# Patient Record
Sex: FEMALE | Race: WHITE | Employment: FULL TIME | URBAN - METROPOLITAN AREA
[De-identification: names, ages, dates, MRNs, and addresses within clinical notes are randomized per-mention and may not be internally consistent; named-entity substitution may affect disease eponyms.]

---

## 2017-01-27 ENCOUNTER — OFFICE VISIT (OUTPATIENT)
Dept: FAMILY MEDICINE | Facility: CLINIC | Age: 37
End: 2017-01-27
Payer: COMMERCIAL

## 2017-01-27 VITALS
SYSTOLIC BLOOD PRESSURE: 110 MMHG | HEIGHT: 62 IN | WEIGHT: 174 LBS | HEART RATE: 72 BPM | TEMPERATURE: 98.3 F | RESPIRATION RATE: 16 BRPM | BODY MASS INDEX: 32.02 KG/M2 | OXYGEN SATURATION: 98 % | DIASTOLIC BLOOD PRESSURE: 78 MMHG

## 2017-01-27 DIAGNOSIS — L21.0 SEBORRHEA CAPITIS: ICD-10-CM

## 2017-01-27 DIAGNOSIS — J20.9 ACUTE BRONCHITIS WITH SYMPTOMS > 10 DAYS: Primary | ICD-10-CM

## 2017-01-27 PROCEDURE — 99213 OFFICE O/P EST LOW 20 MIN: CPT | Performed by: PHYSICIAN ASSISTANT

## 2017-01-27 RX ORDER — SELENIUM SULFIDE 2.5 MG/100ML
1 LOTION TOPICAL
Qty: 118 ML | Refills: 0 | Status: SHIPPED | OUTPATIENT
Start: 2017-01-28 | End: 2021-01-29

## 2017-01-27 RX ORDER — AZITHROMYCIN 250 MG/1
TABLET, FILM COATED ORAL
Qty: 6 TABLET | Refills: 0 | Status: SHIPPED | OUTPATIENT
Start: 2017-01-27 | End: 2017-07-11

## 2017-01-27 ASSESSMENT — ENCOUNTER SYMPTOMS
NAUSEA: 0
DIARRHEA: 0
FEVER: 0
CHILLS: 0
SHORTNESS OF BREATH: 0
HEADACHES: 0
COUGH: 1
ABDOMINAL PAIN: 0
FOCAL WEAKNESS: 0
SORE THROAT: 1
VOMITING: 0

## 2017-01-27 NOTE — PROGRESS NOTES
SUBJECTIVE:                                                    Angeline Aceves is a 36 year old female who presents to clinic today for the following health issues:      RESPIRATORY SYMPTOMS      Duration: 12/21/2016    Description  nasal congestion, rhinorrhea, sore throat, cough    Severity: moderate    Accompanying signs and symptoms: None    History (predisposing factors):  none    Precipitating or alleviating factors: None    Therapies tried and outcome:  rest and fluids acetaminophen     Additional complaints: None    HPI additional notes:  Patient reports symptoms have not improved since onset. She reports cough is worst symptom right now. Denies weight loss, hemoptysis, night sweats, CP, SOB, orthopnea, PND, leg swelling. No known exposure to TB, pertussis, or pneumonia. No hx tobacco use or lung disease.       Chart Review:  History   Smoking status     Never Smoker    Smokeless tobacco     Never Used     No flowsheet data found.  No flowsheet data found.    Patient Active Problem List   Diagnosis     Unilateral deafness     CARDIOVASCULAR SCREENING; LDL GOAL LESS THAN 160     Seasonal allergies     Large breasts     Headache     Anemia     Irritable bowel syndrome     Other acne     Past Surgical History   Procedure Laterality Date     C anesth,cleft palate repair       10 surgeries 2 weeks-16 years of age     Ent surgery       Tubes as a child, exploratory on right in '97     Problem list, Medication list, Allergies, Medical/Social/Surg hx reviewed in [facility], updated as appropriate.    HPI      Review of Systems   Constitutional: Negative for fever and chills.   HENT: Positive for congestion and sore throat.         Rhinorrhea   Respiratory: Positive for cough. Negative for shortness of breath.    Cardiovascular: Negative for chest pain.   Gastrointestinal: Negative for nausea, vomiting, abdominal pain and diarrhea.   Skin: Negative for rash.   Neurological: Negative for focal weakness and headaches.  "  All other systems reviewed and are negative.        Physical Exam   Constitutional: She is oriented to person, place, and time and well-developed, well-nourished, and in no distress.   HENT:   Head: Normocephalic and atraumatic.   Right Ear: Tympanic membrane, external ear and ear canal normal.   Left Ear: Tympanic membrane, external ear and ear canal normal.   Nose: Nose normal.   Mouth/Throat: Uvula is midline, oropharynx is clear and moist and mucous membranes are normal.   Cardiovascular: Normal rate, regular rhythm and normal heart sounds.    Pulmonary/Chest: Effort normal and breath sounds normal.   Musculoskeletal: Normal range of motion.   Neurological: She is alert and oriented to person, place, and time. Gait normal.   Skin: Skin is warm and dry.   Nursing note and vitals reviewed.      Vital Signs  /78 mmHg  Pulse 72  Temp(Src) 98.3  F (36.8  C) (Oral)  Resp 16  Ht 5' 1.5\" (1.562 m)  Wt 174 lb (78.926 kg)  BMI 32.35 kg/m2  SpO2 98%   Body mass index is 32.35 kg/(m^2).    Diagnostic Test Results:  none     ASSESSMENT/PLAN:                                                    BMI:   Estimated body mass index is 32.35 kg/(m^2) as calculated from the following:    Height as of this encounter: 5' 1.5\" (1.562 m).    Weight as of this encounter: 174 lb (78.926 kg).   Weight management plan: Patient was referred to their PCP to discuss a diet and exercise plan.      ICD-10-CM    1. Acute bronchitis with symptoms > 10 days J20.9 azithromycin (ZITHROMAX) 250 MG tablet   2. Seborrhea capitis L21.0 selenium sulfide (SELSUN) 2.5 % lotion   Lungs CTAB, afebrile, O2 sat 98%- CXR not indicated. Will cover for pertussis and acute bronchitis with azithromycin. Pt declines Rx for cough medication.    Selsun lotion refilled. Encouraged pt to make appt for routine physical.    I have discussed any lab or imaging results, the patient's diagnosis, and my plan of treatment with the patient and/or family. Patient is " aware to come back in if with worsening symptoms or if no relief despite treatment plan.  Patient voiced understanding and had no further questions.       Follow Up: Data Unavailable    PRACHI Hagen, PA-C  [facility]

## 2017-01-27 NOTE — NURSING NOTE
"Chief Complaint   Patient presents with     Cough       Initial /78 mmHg  Pulse 72  Temp(Src) 98.3  F (36.8  C) (Oral)  Resp 16  Ht 5' 1.5\" (1.562 m)  Wt 174 lb (78.926 kg)  BMI 32.35 kg/m2  SpO2 98% Estimated body mass index is 32.35 kg/(m^2) as calculated from the following:    Height as of this encounter: 5' 1.5\" (1.562 m).    Weight as of this encounter: 174 lb (78.926 kg).  BP completed using cuff size: dean Salazar CMA      "

## 2017-01-27 NOTE — PROGRESS NOTES
"  SUBJECTIVE:                                                    Angeline Aceves is a 36 year old female who presents to clinic today for the following health issues:  {Provider please address medication reconciliation discrepancies--rooming staff please delete if no med/rec issues}    Cough      Duration: ***    Description (location/character/radiation): ***    Intensity:  {mild,moderate,severe:874425}    Accompanying signs and symptoms: ***    History (similar episodes/previous evaluation): {.:859193::\"None\"}    Precipitating or alleviating factors: {.:922837::\"None\"}    Therapies tried and outcome: {.:266125::\"None\"}       {additional problems for provider to add:651958}    Problem list and histories reviewed & adjusted, as indicated.  Additional history: as documented    {HIST REVIEW/ LINKS 2:742024}    {PROVIDER CHARTING PREFERENCE:465217}    "

## 2017-07-11 ENCOUNTER — OFFICE VISIT (OUTPATIENT)
Dept: FAMILY MEDICINE | Facility: CLINIC | Age: 37
End: 2017-07-11
Payer: COMMERCIAL

## 2017-07-11 VITALS
BODY MASS INDEX: 32.02 KG/M2 | RESPIRATION RATE: 16 BRPM | WEIGHT: 174 LBS | OXYGEN SATURATION: 98 % | HEIGHT: 62 IN | HEART RATE: 78 BPM | SYSTOLIC BLOOD PRESSURE: 115 MMHG | DIASTOLIC BLOOD PRESSURE: 74 MMHG | TEMPERATURE: 98 F

## 2017-07-11 DIAGNOSIS — J30.2 CHRONIC SEASONAL ALLERGIC RHINITIS, UNSPECIFIED TRIGGER: ICD-10-CM

## 2017-07-11 DIAGNOSIS — Z01.419 ENCOUNTER FOR GYNECOLOGICAL EXAMINATION WITHOUT ABNORMAL FINDING: Primary | ICD-10-CM

## 2017-07-11 LAB — HGB BLD-MCNC: 11.9 G/DL (ref 11.7–15.7)

## 2017-07-11 PROCEDURE — 80061 LIPID PANEL: CPT | Performed by: NURSE PRACTITIONER

## 2017-07-11 PROCEDURE — 99395 PREV VISIT EST AGE 18-39: CPT | Performed by: NURSE PRACTITIONER

## 2017-07-11 PROCEDURE — 85018 HEMOGLOBIN: CPT | Performed by: NURSE PRACTITIONER

## 2017-07-11 PROCEDURE — 82947 ASSAY GLUCOSE BLOOD QUANT: CPT | Performed by: NURSE PRACTITIONER

## 2017-07-11 PROCEDURE — 36415 COLL VENOUS BLD VENIPUNCTURE: CPT | Performed by: NURSE PRACTITIONER

## 2017-07-11 RX ORDER — FLUTICASONE PROPIONATE 50 MCG
1-2 SPRAY, SUSPENSION (ML) NASAL DAILY
Qty: 1 BOTTLE | Refills: 11 | COMMUNITY
Start: 2017-07-11 | End: 2021-01-29

## 2017-07-11 RX ORDER — BETAMETHASONE DIPROPIONATE 0.5 MG/G
LOTION TOPICAL 2 TIMES DAILY
COMMUNITY
End: 2021-01-29

## 2017-07-11 RX ORDER — ADAPALENE 0.1 G/100G
CREAM TOPICAL AT BEDTIME
COMMUNITY
End: 2021-01-29

## 2017-07-11 NOTE — MR AVS SNAPSHOT
After Visit Summary   7/11/2017    Angeline Aceves    MRN: [id number]           Patient Information     Date Of Birth          1980        Visit Information        Provider Department      7/11/2017 7:40 AM Tri Wagner APRN [facility]        Today's Diagnoses     Encounter for gynecological examination without abnormal finding    -  1    Chronic seasonal allergic rhinitis, unspecified trigger          Care Instructions      Preventive Health Recommendations  Female Ages 26 - 39  Yearly exam:   See your health care provider every year in order to    Review health changes.     Discuss preventive care.      Review your medicines if you your doctor has prescribed any.    Until age 30: Get a Pap test every three years (more often if you have had an abnormal result).    After age 30: Talk to your doctor about whether you should have a Pap test every 3 years or have a Pap test with HPV screening every 5 years.   You do not need a Pap test if your uterus was removed (hysterectomy) and you have not had cancer.  You should be tested each year for STDs (sexually transmitted diseases), if you're at risk.   Talk to your provider about how often to have your cholesterol checked.  If you are at risk for diabetes, you should have a diabetes test (fasting glucose).  Shots: Get a flu shot each year. Get a tetanus shot every 10 years.   Nutrition:     Eat at least 5 servings of fruits and vegetables each day.    Eat whole-grain bread, whole-wheat pasta and brown rice instead of white grains and rice.    Talk to your provider about Calcium and Vitamin D.     Lifestyle    Exercise at least 150 minutes a week (30 minutes a day, 5 days of the week). This will help you control your weight and prevent disease.    Limit alcohol to one drink per day.    No smoking.     Wear sunscreen to prevent skin cancer.    See your dentist every six months for an exam and cleaning.    Seasonal  Allergy  Seasonal allergy is also called hay fever. It may occur after a person is exposed to pollens released from grasses, weeds, trees and shrubs. This type of allergy occurs during the spring and summer when the pollen contacts the lining of the nose, eyes, eyelids, sinuses and throat. This causes histamine to be released from the tissues.  Histamine causes itching and swelling.  This may produce a watery discharge from the eyes or nose. Violent sneezing, nasal congestion, post-nasal drip, itching of the eyes, nose, throat and mouth, scratchy throat, and dry cough may also occur.    Home care  Seasonal allergy cannot be cured, but symptoms can be reduced by these measures:    Avoid or reduce exposure to the allergen as much as you can:      Stay indoors on windy days of pollen season.     Keep windows and doors closed. Use air conditioning instead in your home and car. This filters the air.    Change air conditioner filters often.    Decongestant pills and sprays reduce tissue swelling and watery discharge. Overuse of nasal decongestant sprays may make symptoms worse. Do not use these more often than recommended. Sometimes you can experience a rebound effect (symptoms worsen), when stopping them. Talk to your healthcare provider or pharmacist about these medicines before taking them, especially if you have hypertension or heart problems.     Antihistamines block the release of histamine during the allergic response. They work better when taken BEFORE symptoms develop. Unless a prescription antihistamine was prescribed, you can take over-the-counter antihistamines that do not cause drowsiness.  Ask your pharmacist for suggestions.    Steroid nasal sprays or oral steroids may also be prescribed for more severe symptoms. These help to reduce the local inflammation that can add to the allergic response.    If you have asthma, pollen season may make your asthma symptoms worse. It is important that you use your asthma  medicines as directed during this time to prevent or treat attacks. Some persons with ASTHMA have asthma symptoms that get worse when they take antihistamines. This is due to the drying effect on the lungs. If you notice this, stop the antihistamines, drink extra fluids and notify your doctor.    If you have sinus congestion or drainage, a saline nasal rinse may give relief. A saline nasal rinse lessens the swelling and clears excess mucus. This allows sinuses to drain. Prepackaged kits are sold at most drug stores. These contain pre-mixed salt packets and an irrigation device.  Follow-up care  Follow up with your healthcare provider or as directed. If you have been referred to a specialist, make an appointment promptly.  When to seek medical advice  Call your healthcare provider for any of the following:    Facial, ear or sinus pain; colored drainage from the nose    Severe headache    You have asthma and your asthma symptoms do not respond to the usual doses of your medicine    Cough with lots of colored sputum (mucus)    Fever of 100.4 F (38 C) or higher or as directed by the healthcare provider  Call 911 if any of these occur:    Trouble breathing or swallowing, wheezing    Hoarse voice or trouble speaking    Confusion    Very drowsy or trouble awakening    Fainting or loss of consciousness    Rapid heart rate    Low blood pressure    Feeling of doom    Nausea, vomiting, abdominal pain, diarrhea    Vomiting blood, or large amounts of blood in stool    Seizure  Date Last Reviewed: 7/30/2015 2000-2017 [employer]. [address] [place] [address]. All rights reserved. This information is not intended as a substitute for professional medical care. Always follow your healthcare professional's instructions.                Follow-ups after your visit        Who to contact     If you have questions or need follow up information about today's clinic visit or your schedule please contact  "[facility] directly at [phone].  Normal or non-critical lab and imaging results will be communicated to you by MyChart, letter or phone within 4 business days after the clinic has received the results. If you do not hear from us within 7 days, please contact the clinic through [employer] or phone. If you have a critical or abnormal lab result, we will notify you by phone as soon as possible.  Submit refill requests through [facility] or call your pharmacy and they will forward the refill request to us. Please allow 3 business days for your refill to be completed.          Additional Information About Your Visit        [employer] Information     [facility] gives you secure access to your electronic health record. If you see a primary care provider, you can also send messages to your care team and make appointments. If you have questions, please call your primary care clinic.  If you do not have a primary care provider, please call [phone] and they will assist you.        Care EveryWhere ID     This is your Care EveryWhere ID. This could be used by other organizations to access your [facility] medical records  [id number]        Your Vitals Were     Pulse Temperature Respirations Height Last Period Pulse Oximetry    78 98  F (36.7  C) (Oral) 16 5' 1.5\" (1.562 m) 06/19/2017 98%    Breastfeeding? BMI (Body Mass Index)                No 32.34 kg/m2           Blood Pressure from Last 3 Encounters:   07/11/17 115/74   01/27/17 110/78   09/25/15 114/76    Weight from Last 3 Encounters:   07/11/17 174 lb (78.9 kg)   01/27/17 174 lb (78.9 kg)   09/25/15 174 lb 12.8 oz (79.3 kg)              We Performed the Following     Glucose     Hemoglobin     Lipid panel reflex to direct LDL          Today's Medication Changes          These changes are accurate as of: 7/11/17  7:58 AM.  If you have any questions, ask your nurse or doctor.               Start taking these medicines.        Dose/Directions    fluticasone " 50 MCG/ACT spray   Commonly known as:  FLONASE   Used for:  Chronic seasonal allergic rhinitis, unspecified trigger   Started by:  Tri Wagner APRN CNP        Dose:  1-2 spray   Spray 1-2 sprays into both nostrils daily   Quantity:  1 Bottle   Refills:  11         These medicines have changed or have updated prescriptions.        Dose/Directions    loratadine 10 MG tablet   Commonly known as:  CLARITIN   This may have changed:    - when to take this  - reasons to take this   Used for:  Seasonal allergies        Dose:  10 mg   Take 1 tablet by mouth daily.   Quantity:  90 tablet   Refills:  3         Stop taking these medicines if you haven't already. Please contact your care team if you have questions.     azithromycin 250 MG tablet   Commonly known as:  ZITHROMAX   Stopped by:  Tri Wagner APRN CNP                Where to get your medicines      Some of these will need a paper prescription and others can be bought over the counter.  Ask your nurse if you have questions.     You don't need a prescription for these medications     fluticasone 50 MCG/ACT spray                Primary Care Provider Office Phone # Fax #    HEDY Dowling [phone]       [facility] [address] [place] [address]        Equal Access to Services     JAYY JASSO AH: Hadii matt adkinso Soeric, waaxda luqadaha, qaybta kaalmada adeegyada, louie skinner. So [facility] [phone].    ATENCIÓN: Si habla español, tiene a de disposición servicios gratuitos de asistencia lingüística. Llame al [phone].    We comply with applicable federal civil rights laws and [place] laws. We do not discriminate on the basis of race, color, national origin, age, disability sex, sexual orientation or gender identity.            Thank you!     Thank you for choosing [facility]  for your care. Our goal is always to provide you with  excellent care. Hearing back from our patients is one way we can continue to improve our services. Please take a few minutes to complete the written survey that you may receive in the mail after your visit with us. Thank you!             Your Updated Medication List - Protect others around you: Learn how to safely use, store and throw away your medicines at www.disposemymeds.org.          This list is accurate as of: 7/11/17  7:58 AM.  Always use your most recent med list.                   Brand Name Dispense Instructions for use Diagnosis    adapalene 0.1 % cream    DIFFERIN     Apply topically At Bedtime        betamethasone dipropionate 0.05 % lotion    DIPROSONE     Apply topically 2 times daily        fluticasone 50 MCG/ACT spray    FLONASE    1 Bottle    Spray 1-2 sprays into both nostrils daily    Chronic seasonal allergic rhinitis, unspecified trigger       loratadine 10 MG tablet    CLARITIN    90 tablet    Take 1 tablet by mouth daily.    Seasonal allergies       selenium sulfide 2.5 % lotion    SELSUN    118 mL    Apply 1 mL topically three times a week    Seborrhea capitis       TRI-LUCIA 0.01-4-0.05 % Crea   Generic drug:  fluocin-hydroquinone-tretinoin

## 2017-07-11 NOTE — PATIENT INSTRUCTIONS
Preventive Health Recommendations  Female Ages 26 - 39  Yearly exam:   See your health care provider every year in order to    Review health changes.     Discuss preventive care.      Review your medicines if you your doctor has prescribed any.    Until age 30: Get a Pap test every three years (more often if you have had an abnormal result).    After age 30: Talk to your doctor about whether you should have a Pap test every 3 years or have a Pap test with HPV screening every 5 years.   You do not need a Pap test if your uterus was removed (hysterectomy) and you have not had cancer.  You should be tested each year for STDs (sexually transmitted diseases), if you're at risk.   Talk to your provider about how often to have your cholesterol checked.  If you are at risk for diabetes, you should have a diabetes test (fasting glucose).  Shots: Get a flu shot each year. Get a tetanus shot every 10 years.   Nutrition:     Eat at least 5 servings of fruits and vegetables each day.    Eat whole-grain bread, whole-wheat pasta and brown rice instead of white grains and rice.    Talk to your provider about Calcium and Vitamin D.     Lifestyle    Exercise at least 150 minutes a week (30 minutes a day, 5 days of the week). This will help you control your weight and prevent disease.    Limit alcohol to one drink per day.    No smoking.     Wear sunscreen to prevent skin cancer.    See your dentist every six months for an exam and cleaning.    Seasonal Allergy  Seasonal allergy is also called hay fever. It may occur after a person is exposed to pollens released from grasses, weeds, trees and shrubs. This type of allergy occurs during the spring and summer when the pollen contacts the lining of the nose, eyes, eyelids, sinuses and throat. This causes histamine to be released from the tissues.  Histamine causes itching and swelling.  This may produce a watery discharge from the eyes or nose. Violent sneezing, nasal congestion,  post-nasal drip, itching of the eyes, nose, throat and mouth, scratchy throat, and dry cough may also occur.    Home care  Seasonal allergy cannot be cured, but symptoms can be reduced by these measures:    Avoid or reduce exposure to the allergen as much as you can:      Stay indoors on windy days of pollen season.     Keep windows and doors closed. Use air conditioning instead in your home and car. This filters the air.    Change air conditioner filters often.    Decongestant pills and sprays reduce tissue swelling and watery discharge. Overuse of nasal decongestant sprays may make symptoms worse. Do not use these more often than recommended. Sometimes you can experience a rebound effect (symptoms worsen), when stopping them. Talk to your healthcare provider or pharmacist about these medicines before taking them, especially if you have hypertension or heart problems.     Antihistamines block the release of histamine during the allergic response. They work better when taken BEFORE symptoms develop. Unless a prescription antihistamine was prescribed, you can take over-the-counter antihistamines that do not cause drowsiness.  Ask your pharmacist for suggestions.    Steroid nasal sprays or oral steroids may also be prescribed for more severe symptoms. These help to reduce the local inflammation that can add to the allergic response.    If you have asthma, pollen season may make your asthma symptoms worse. It is important that you use your asthma medicines as directed during this time to prevent or treat attacks. Some persons with ASTHMA have asthma symptoms that get worse when they take antihistamines. This is due to the drying effect on the lungs. If you notice this, stop the antihistamines, drink extra fluids and notify your doctor.    If you have sinus congestion or drainage, a saline nasal rinse may give relief. A saline nasal rinse lessens the swelling and clears excess mucus. This allows sinuses to drain.  Prepackaged kits are sold at most drug stores. These contain pre-mixed salt packets and an irrigation device.  Follow-up care  Follow up with your healthcare provider or as directed. If you have been referred to a specialist, make an appointment promptly.  When to seek medical advice  Call your healthcare provider for any of the following:    Facial, ear or sinus pain; colored drainage from the nose    Severe headache    You have asthma and your asthma symptoms do not respond to the usual doses of your medicine    Cough with lots of colored sputum (mucus)    Fever of 100.4 F (38 C) or higher or as directed by the healthcare provider  Call 911 if any of these occur:    Trouble breathing or swallowing, wheezing    Hoarse voice or trouble speaking    Confusion    Very drowsy or trouble awakening    Fainting or loss of consciousness    Rapid heart rate    Low blood pressure    Feeling of doom    Nausea, vomiting, abdominal pain, diarrhea    Vomiting blood, or large amounts of blood in stool    Seizure  Date Last Reviewed: 7/30/2015 2000-2017 [employer]. [address] [place] [address], [place] [address]. All rights reserved. This information is not intended as a substitute for professional medical care. Always follow your healthcare professional's instructions.

## 2017-07-11 NOTE — PROGRESS NOTES
SUBJECTIVE:   CC: Angeline Aceves is an 36 year old woman who presents for preventive health visit.     Physical   Annual:     Getting at least 3 servings of Calcium per day::  Yes    Bi-annual eye exam::  Yes    Dental care twice a year::  Yes    Sleep apnea or symptoms of sleep apnea::  None    Diet::  Regular (no restrictions)    Frequency of exercise::  4-5 days/week    Duration of exercise::  30-45 minutes    Taking medications regularly::  Yes    Medication side effects::  Not applicable    Additional concerns today::  YES    1. Seasonal allergies   Have been bad this spring/summer.  Claritin during the day.  Benadryl at night when problems.  Runny nose and eyes, sneezing.    2. Dermatolgy.   Dermatology specialists.  Managing topicals (acne and scalp dandruff, etc).      Today's PHQ-2 Score:   PHQ-2 ( 1999 [employer]) 7/5/2017   Q1: Little interest or pleasure in doing things 0   Q2: Feeling down, depressed or hopeless 0   PHQ-2 Score 0   Q1: Little interest or pleasure in doing things Not at all   Q2: Feeling down, depressed or hopeless Not at all   PHQ-2 Score 0       Abuse: Current or Past(Physical, Sexual or Emotional)- No  Do you feel safe in your environment - Yes    Social History   Substance Use Topics     Smoking status: Never Smoker     Smokeless tobacco: Never Used     Alcohol use 1.0 - 1.5 oz/week      Comment: 3-4 drinks per week     The patient does not drink >3 drinks per day nor >7 drinks per week.    Reviewed orders with patient.  Reviewed health maintenance and updated orders accordingly - Yes  Labs reviewed in EPIC  BP Readings from Last 3 Encounters:   07/11/17 115/74   01/27/17 110/78   09/25/15 114/76    Wt Readings from Last 3 Encounters:   07/11/17 174 lb (78.9 kg)   01/27/17 174 lb (78.9 kg)   09/25/15 174 lb 12.8 oz (79.3 kg)              Patient Active Problem List   Diagnosis     Unilateral deafness     CARDIOVASCULAR SCREENING; LDL GOAL LESS THAN 160     Seasonal allergies      Large breasts     Headache     Anemia     Irritable bowel syndrome     Other acne     Past Surgical History:   Procedure Laterality Date     C ANESTH,CLEFT PALATE REPAIR      10 surgeries 2 weeks-16 years of age     ENT SURGERY      Tubes as a child, exploratory on right in '97       Social History   Substance Use Topics     Smoking status: Never Smoker     Smokeless tobacco: Never Used     Alcohol use 1.0 - 1.5 oz/week      Comment: 3-4 drinks per week     Family History   Problem Relation Age of Onset     DIABETES Maternal Grandfather      Cancer - colorectal Paternal Grandmother      CEREBROVASCULAR DISEASE Paternal Grandmother          Current Outpatient Prescriptions   Medication Sig Dispense Refill     fluocin-hydroquinone-tretinoin (TRI-LUCIA) 0.01-4-0.05 % CREA        adapalene (DIFFERIN) 0.1 % cream Apply topically At Bedtime       betamethasone dipropionate (DIPROSONE) 0.05 % lotion Apply topically 2 times daily       loratadine (CLARITIN) 10 MG tablet Take 1 tablet by mouth daily. (Patient taking differently: Take 10 mg by mouth as needed ) 90 tablet 3     selenium sulfide (SELSUN) 2.5 % lotion Apply 1 mL topically three times a week (Patient not taking: Reported on 7/11/2017) 118 mL 0     No Known Allergies  Recent Labs   Lab Test  09/25/15   0943  06/20/14   1432  01/22/13   0851   LDL  113  116  120   HDL  44*  45*  49*   TRIG  165*  121  83        Mammogram not appropriate for this patient based on age.    Pertinent mammograms are reviewed under the imaging tab.  History of abnormal Pap smear:   Last 3 Pap Results:   PAP (no units)   Date Value   09/25/2015 NIL   01/18/2013 NIL   09/27/2010 NIL       Reviewed and updated as needed this visit by clinical staff         Reviewed and updated as needed this visit by Provider            ROS:  C: NEGATIVE for fever, chills, change in weight  I: NEGATIVE for worrisome rashes, moles or lesions  E: NEGATIVE for vision changes or irritation  ENT: NEGATIVE for  ear, mouth and throat problems  R: NEGATIVE for significant cough or SOB  B: NEGATIVE for masses, tenderness or discharge  CV: NEGATIVE for chest pain, palpitations or peripheral edema  GI: NEGATIVE for nausea, abdominal pain, heartburn, or change in bowel habits  : NEGATIVE for unusual urinary or vaginal symptoms. Periods are regular.  M: NEGATIVE for significant arthralgias or myalgia  N: NEGATIVE for weakness, dizziness or paresthesias  P: NEGATIVE for changes in mood or affect     OBJECTIVE:   There were no vitals taken for this visit.  EXAM:  GENERAL: healthy, alert and no distress  EYES: Eyes grossly normal to inspection, PERRL and conjunctivae and sclerae normal  HENT: ear canals and TM's normal, nose and mouth without ulcers or lesions. Nose and posterior oropharynx with assessment consistent of cleft palate repair.  NECK: no adenopathy, no asymmetry, masses, or scars and thyroid normal to palpation  RESP: lungs clear to auscultation - no rales, rhonchi or wheezes  BREAST: normal without masses, tenderness or nipple discharge and no palpable axillary masses or adenopathy  CV: regular rate and rhythm, normal S1 S2, no S3 or S4, no murmur, click or rub, no peripheral edema and peripheral pulses strong  ABDOMEN: soft, nontender, no hepatosplenomegaly, no masses and bowel sounds normal   (female): normal female external genitalia, normal urethral meatus, vaginal mucosa pink, moist, well rugated, and normal cervix/adnexa/uterus without masses or discharge  MS: no gross musculoskeletal defects noted, no edema  SKIN: no suspicious lesions or rashes  NEURO: Normal strength and tone, mentation intact and speech normal  PSYCH: mentation appears normal, affect normal/bright    ASSESSMENT/PLAN:   (Z01.419) Encounter for gynecological examination without abnormal finding  (primary encounter diagnosis)  Comment:   Plan: Lipid panel reflex to direct LDL, Hemoglobin,         Glucose        We discussed and reviewed her  "previous labs, elevated triglycerides, low HDL cholesterol, borderline blood sugar.  I reviewed healthy diet, fewer carbohydrates, more aerobic activity etc. Fasting labs are pending today.  She was appreciative of the information.    (J30.2) Chronic seasonal allergic rhinitis, unspecified trigger  Comment:   Plan: fluticasone (FLONASE) 50 MCG/ACT spray        She will continue her Claritin daily.  She will consider the [place] pot (but she doesn't think she can use it).  She will add Flonase nasal spray.  She will follow-up with me with any lingering concerns.    COUNSELING:  Reviewed preventive health counseling, as reflected in patient instructions       reports that she has never smoked. She has never used smokeless tobacco.    Estimated body mass index is 32.34 kg/(m^2) as calculated from the following:    Height as of 1/27/17: 5' 1.5\" (1.562 m).    Weight as of 1/27/17: 174 lb (78.9 kg).   Weight management plan: Discussed healthy diet and exercise guidelines and patient will follow up in 12 months in clinic to re-evaluate.    Counseling Resources:  ATP IV Guidelines  Pooled Cohorts Equation Calculator  Breast Cancer Risk Calculator  FRAX Risk Assessment  ICSI Preventive Guidelines  Dietary Guidelines for Americans, 2010  USDA's MyPlate  ASA Prophylaxis  Lung CA Screening    [facility]  Answers for HPI/ROS submitted by the patient on 7/5/2017   PHQ-2 Score: 0    "

## 2017-07-11 NOTE — NURSING NOTE
"Chief Complaint   Patient presents with     Physical       Initial /74  Pulse 78  Temp 98  F (36.7  C) (Oral)  Resp 16  Ht 5' 1.5\" (1.562 m)  Wt 174 lb (78.9 kg)  LMP 06/19/2017  SpO2 98%  Breastfeeding? No  BMI 32.34 kg/m2 Estimated body mass index is 32.34 kg/(m^2) as calculated from the following:    Height as of this encounter: 5' 1.5\" (1.562 m).    Weight as of this encounter: 174 lb (78.9 kg).  Medication Reconciliation: complete       Stepan Isabel MA       "

## 2017-07-13 LAB
CHOLEST SERPL-MCNC: 202 MG/DL
GLUCOSE SERPL-MCNC: 83 MG/DL (ref 70–99)
HDLC SERPL-MCNC: 53 MG/DL
LDLC SERPL CALC-MCNC: 131 MG/DL
NONHDLC SERPL-MCNC: 149 MG/DL
TRIGL SERPL-MCNC: 92 MG/DL

## 2017-11-28 ENCOUNTER — MYC MEDICAL ADVICE (OUTPATIENT)
Dept: FAMILY MEDICINE | Facility: CLINIC | Age: 37
End: 2017-11-28

## 2017-11-28 ENCOUNTER — E-VISIT (OUTPATIENT)
Dept: FAMILY MEDICINE | Facility: CLINIC | Age: 37
End: 2017-11-28

## 2017-11-28 DIAGNOSIS — N89.8 VAGINAL IRRITATION: Primary | ICD-10-CM

## 2018-08-27 ENCOUNTER — THERAPY VISIT (OUTPATIENT)
Dept: PHYSICAL THERAPY | Facility: CLINIC | Age: 38
End: 2018-08-27
Payer: COMMERCIAL

## 2018-08-27 DIAGNOSIS — M25.552 HIP PAIN, LEFT: Primary | ICD-10-CM

## 2018-08-27 PROCEDURE — 97140 MANUAL THERAPY 1/> REGIONS: CPT | Mod: GP | Performed by: PHYSICAL THERAPIST

## 2018-08-27 PROCEDURE — 97110 THERAPEUTIC EXERCISES: CPT | Mod: GP | Performed by: PHYSICAL THERAPIST

## 2018-08-27 PROCEDURE — 97161 PT EVAL LOW COMPLEX 20 MIN: CPT | Mod: GP | Performed by: PHYSICAL THERAPIST

## 2018-08-27 ASSESSMENT — ACTIVITIES OF DAILY LIVING (ADL)
GETTING_INTO_AND_OUT_OF_A_BATHTUB: NO DIFFICULTY AT ALL
WALKING_APPROXIMATELY_10_MINUTES: NO DIFFICULTY AT ALL
HOS_ADL_HIGHEST_POTENTIAL_SCORE: 68
WALKING_DOWN_STEEP_HILLS: NO DIFFICULTY AT ALL
WALKING_15_MINUTES_OR_GREATER: NO DIFFICULTY AT ALL
WALKING_UP_STEEP_HILLS: NO DIFFICULTY AT ALL
GOING_UP_1_FLIGHT_OF_STAIRS: SLIGHT DIFFICULTY
LIGHT_TO_MODERATE_WORK: NO DIFFICULTY AT ALL
GOING_DOWN_1_FLIGHT_OF_STAIRS: SLIGHT DIFFICULTY
PUTTING_ON_SOCKS_AND_SHOES: NO DIFFICULTY AT ALL
HEAVY_WORK: NO DIFFICULTY AT ALL
STEPPING_UP_AND_DOWN_CURBS: SLIGHT DIFFICULTY
HOS_ADL_ITEM_SCORE_TOTAL: 61
DEEP_SQUATTING: SLIGHT DIFFICULTY
GETTING_INTO_AND_OUT_OF_AN_AVERAGE_CAR: NO DIFFICULTY AT ALL
HOS_ADL_SCORE(%): 89.71
WALKING_INITIALLY: SLIGHT DIFFICULTY
RECREATIONAL_ACTIVITIES: SLIGHT DIFFICULTY
HOS_ADL_COUNT: 17
STANDING_FOR_15_MINUTES: NO DIFFICULTY AT ALL
SITTING_FOR_15_MINUTES: SLIGHT DIFFICULTY
TWISTING/PIVOTING_ON_INVOLVED_LEG: SLIGHT DIFFICULTY
ROLLING_OVER_IN_BED: NO DIFFICULTY AT ALL

## 2018-08-28 NOTE — PROGRESS NOTES
[facility] Initial Evaluation  Subjective:  HPI                    Objective:  System    Physical Exam    General     [facility] Initial Evaluation -- Lumbar    Referral: self  Work mechanical stresses:  sitting  Employment status:  full  Leisure mechanical stresses: training for marathon  Functional disability score (JUDY/STarT Back):  na  VAS score (0-10): 5-6/10  Patient goals/expectations:  Dec pain w running    HISTORY:    Present symptoms: Left lateral hip pain into lateral thigh; left foot pain (2/3rd MET)  Pain quality (sharp/shooting/stabbing/aching/burning/cramping):  Ache left SI/hip area; sharp left foot; shooting thigh   Paresthesia (yes/no):  Foot, lateral thigh    Present since (onset date): Left foot June 2018; left hip 5 yrs ago.     Symptoms (improving/unchanging/worsening):  unchanging.     Symptoms commenced as a result of: Left foot onset with training for marathon- noted sharp pain 2/3/4 MET pain that would increase at 3-4 miles running, then would decrease or ? become numb after 10 miles.  Unable to reproduce pain with palpation, no pain with walking.  Left hip pain 5 yrs ago after falling; intermittantly nagging/achy since then, may improve with running. Current running mileage 25-28 miles/week, longest run 16 miles; new shoes in July (neutral)  Condition occurred in the following environment:   recreational     Symptoms at onset (back/thigh/leg): foot  Constant symptoms (back/thigh/leg): left LB/SI/lateral hip  Intermittent symptoms (back/thigh/leg): thigh, foot    Symptoms are made worse with the following: Always Sitting (left hip/thigh), Sometimes Standing (left LB) and Other - left foot running   Symptoms are made better with the following: Always On the move (back/hip)    Disturbed sleep (yes/no):  no Sleeping postures (prone/sup/side R/L): na    Previous episodes (0/1-5/6-10/11+): 0 foot, 1-5 hip Year of first episode: 2013 hip; 2018  foot    Previous history: Bone graft left hip w lateral thigh numbness  Previous treatments: none    Specific Questions:  Cough/Sneeze/Strain (pos/neg): neg  Bowel/Bladder (normal/abnormal): normal  Gait (normal/abnormal): normal  Medications (nil/NSAIDS/analg/steroids/anticoag/other): see Epic  General health (excellent/good/fair/poor):  good  Pertinent medical history:  See Epic  Imaging (None/Xray/MRI/Other):  none  Recent or major surgery (yes/no):  no  Night pain (yes/no): no  Accidents (yes/no): no  Unexplained weight loss (yes/no): no  Barriers at home: no  Other red flags: no    EXAMINATION    Posture:   Sitting (good/fair/poor): fair  Standing (good/fair/poor):fair  Lordosis (red/acc/normal): red  Correction of posture (better/worse/no effect): better    Lateral Shift (right/left/nil): no  Relevant (yes/no):  na  Other Observations: na    Neurological:    Motor deficit:  no  Reflexes:  nt  Sensory deficit:  no  Dural signs:  -slump, SLR    Movement Loss:   Dhiraj Mod Min Nil Pain   Flexion    x ankles   Extension   x  left   Side Gliding R   x  left   Side Gliding L     -     Test Movements:   During: produces, abolishes, increases, decreases, no effect, centralizing, peripheralizing   After: better, worse, no better, no worse, no effect, centralized, peripheralized    Pretest symptoms standing: left LB/SI mild; left foot 2/3 MET ++   Symptoms During Symptoms After ROM increased ROM decreased No Effect   FIS No Effect    No Effect         Rep FIS No Effect    No Effect         EIS        Rep EIS        Pretest symptoms lying: left foot 2/3 MET ++    Symptoms During Symptoms After ROM increased ROM decreased No Effect   MAILE No Effect    No Effect         Rep MAILE No Effect    No Effect         EIL No Effect    No Effect         Rep EIL hips shifted Produces  Left LB    No Worse  Left LB; better foot        SGIS       Other test: - hip scour, - FADIR, hip ROM equal/pain free bilateral    Provisional  Classification:  Derangement - Asymmetrical, unilateral, symptoms below knee    Principle of Management:  Education: (Therapeutic Exercise): Pt education regarding four stages of healing: pain reduction, maintenance through exercise, recovery of function, and prevention of re-occurrence. Utilized prescription dosage of exercise theory, cut finger analogy, and scientific method to help patient understand purpose of exercise specificity and importance of compliance with exercise.  Pt also educated in traffic light response to exercise, and self assessment to guide home exercise program.    Equipment provided:  Postural correction with instruct in use of lumbar roll and sitting posture when unsupported, w education provided re: impact of posture and body mechanics on symptom production. Pt encouraged to monitor this correlation as part of overall self management.  Mechanical therapy (Y/N):  y   Extension principle:  y  Lateral Principle:  y  Flexion principle:  n  Other:  May need running assessment, foot intervention    ASSESSMENT/PLAN:    Patient is a 37 year old female with left side hip complaints.    Patient has the following significant findings with corresponding treatment plan.                Diagnosis 1:  Left hip pain  Pain -  manual therapy, self management, education, directional preference exercise and home program  Impaired muscle performance - neuro re-education  Decreased function - therapeutic activities  Impaired posture - neuro re-education    Therapy Evaluation Codes:   1) History comprised of:   Personal factors that impact the plan of care:      None.    Comorbidity factors that impact the plan of care are:      None.     Medications impacting care: None.  2) Examination of Body Systems comprised of:   Body structures and functions that impact the plan of care:      Hip.   Activity limitations that impact the plan of care are:      Running and Sitting.  3) Clinical presentation characteristics  are:   Stable/Uncomplicated.  4) Decision-Making    Low complexity using standardized patient assessment instrument and/or measureable assessment of functional outcome.  Cumulative Therapy Evaluation is: Low complexity.    Previous and current functional limitations:  (See Goal Flow Sheet for this information)    Short term and Long term goals: (See Goal Flow Sheet for this information)     Communication ability:  Patient appears to be able to clearly communicate and understand verbal and written communication and follow directions correctly.  Treatment Explanation - The following has been discussed with the patient:   RX ordered/plan of care  Anticipated outcomes  Possible risks and side effects  This patient would benefit from PT intervention to resume normal activities.   Rehab potential is good.    Frequency:  1 X week, once daily  Duration:  for 3 weeks tapering to 1 X a month over 9 weeks  Discharge Plan:  Achieve all LTG.  Independent in home treatment program.  Reach maximal therapeutic benefit.    Please refer to the daily flowsheet for treatment today, total treatment time and time spent performing 1:1 timed codes.           Assessment/Plan:

## 2018-09-07 ENCOUNTER — THERAPY VISIT (OUTPATIENT)
Dept: PHYSICAL THERAPY | Facility: CLINIC | Age: 38
End: 2018-09-07
Payer: COMMERCIAL

## 2018-09-07 DIAGNOSIS — M25.552 HIP PAIN, LEFT: ICD-10-CM

## 2018-09-07 PROCEDURE — 97110 THERAPEUTIC EXERCISES: CPT | Mod: GP | Performed by: PHYSICAL THERAPIST

## 2018-09-07 PROCEDURE — 97530 THERAPEUTIC ACTIVITIES: CPT | Mod: GP | Performed by: PHYSICAL THERAPIST

## 2018-11-25 ENCOUNTER — HEALTH MAINTENANCE LETTER (OUTPATIENT)
Age: 38
End: 2018-11-25

## 2019-06-04 NOTE — PROGRESS NOTES
Discharge Note    Progress reporting period is from initial progress note on  Aug.27, 2018 to Sep 7, 2018.    Angeline failed to follow up and current status is unknown.  Please see information below for last relevant information on current status.  Patient seen for 2 visits.    SUBJECTIVE  Subjective changes noted by patient:  Mile 3-11 onset foot pain, then goes away.  Left lateral hip tightness intermittant, less LBP with ext exercise  .  Current pain level is 0/10.     Previous pain level was   .   Changes in function:  Yes (See Goal flowsheet attached for changes in current functional level)  Adverse reaction to treatment or activity: None    OBJECTIVE  Changes noted in objective findings: Baseline SGIS right ++ abolish/better extension w hips shifted.  Tenderness 2/3 MET, not reproducible with nerve tension testing. Running assessment done and treatment corresponds to goals.    ASSESSMENT/PLAN  Diagnosis: Left hip pain   Updated problem list and treatment plan:   Pain - HEP  Decreased function - HEP  Impaired muscle performance - HEP  Impaired posture - HEP  STG/LTGs have been met or progress has been made towards goals:  Yes, please see goal flowsheet for most current information  Assessment of Progress: current status is unknown.    Last current status: Pt is progressing as expected   Self Management Plans:  HEP  I have re-evaluated this patient and find that the nature, scope, duration and intensity of the therapy is appropriate for the medical condition of the patient.  Angeline continues to require the following intervention to meet STG and LTG's:  HEP.    Recommendations:  Discharge with current home program.  Patient to follow up with MD as needed.    Please refer to the daily flowsheet for treatment today, total treatment time and time spent performing 1:1 timed codes.

## 2019-06-17 PROBLEM — J30.2 CHRONIC SEASONAL ALLERGIC RHINITIS: Status: ACTIVE | Noted: 2017-07-11

## 2019-08-11 PROBLEM — M25.552 HIP PAIN, LEFT: Status: RESOLVED | Noted: 2018-08-27 | Resolved: 2019-08-11

## 2019-10-02 ENCOUNTER — OFFICE VISIT (OUTPATIENT)
Dept: FAMILY MEDICINE | Facility: CLINIC | Age: 39
End: 2019-10-02
Payer: COMMERCIAL

## 2019-10-02 VITALS
OXYGEN SATURATION: 99 % | BODY MASS INDEX: 33.13 KG/M2 | SYSTOLIC BLOOD PRESSURE: 122 MMHG | WEIGHT: 180 LBS | RESPIRATION RATE: 16 BRPM | DIASTOLIC BLOOD PRESSURE: 88 MMHG | TEMPERATURE: 97.8 F | HEIGHT: 62 IN | HEART RATE: 77 BPM

## 2019-10-02 DIAGNOSIS — Z00.00 ROUTINE GENERAL MEDICAL EXAMINATION AT A HEALTH CARE FACILITY: Primary | ICD-10-CM

## 2019-10-02 DIAGNOSIS — Z13.1 SCREENING FOR DIABETES MELLITUS: ICD-10-CM

## 2019-10-02 DIAGNOSIS — Z12.4 SCREENING FOR CERVICAL CANCER: ICD-10-CM

## 2019-10-02 DIAGNOSIS — Z13.220 SCREENING FOR HYPERLIPIDEMIA: ICD-10-CM

## 2019-10-02 DIAGNOSIS — N92.6 IRREGULAR PERIODS: ICD-10-CM

## 2019-10-02 DIAGNOSIS — Z13.0 SCREENING FOR IRON DEFICIENCY ANEMIA: ICD-10-CM

## 2019-10-02 LAB — HGB BLD-MCNC: 12.2 G/DL (ref 11.7–15.7)

## 2019-10-02 PROCEDURE — G0145 SCR C/V CYTO,THINLAYER,RESCR: HCPCS | Performed by: NURSE PRACTITIONER

## 2019-10-02 PROCEDURE — 87624 HPV HI-RISK TYP POOLED RSLT: CPT | Performed by: NURSE PRACTITIONER

## 2019-10-02 PROCEDURE — 36415 COLL VENOUS BLD VENIPUNCTURE: CPT | Performed by: NURSE PRACTITIONER

## 2019-10-02 PROCEDURE — 85018 HEMOGLOBIN: CPT | Performed by: NURSE PRACTITIONER

## 2019-10-02 PROCEDURE — 82947 ASSAY GLUCOSE BLOOD QUANT: CPT | Performed by: NURSE PRACTITIONER

## 2019-10-02 PROCEDURE — 80061 LIPID PANEL: CPT | Performed by: NURSE PRACTITIONER

## 2019-10-02 PROCEDURE — 99395 PREV VISIT EST AGE 18-39: CPT | Performed by: NURSE PRACTITIONER

## 2019-10-02 ASSESSMENT — ENCOUNTER SYMPTOMS
MYALGIAS: 0
FEVER: 0
HEARTBURN: 0
DIARRHEA: 0
SORE THROAT: 0
DIZZINESS: 0
HEMATOCHEZIA: 0
NERVOUS/ANXIOUS: 0
CONSTIPATION: 0
PALPITATIONS: 0
BREAST MASS: 0
HEMATURIA: 0
FREQUENCY: 0
ARTHRALGIAS: 0
ABDOMINAL PAIN: 0
SHORTNESS OF BREATH: 0
CHILLS: 0
WEAKNESS: 0
PARESTHESIAS: 0
JOINT SWELLING: 0
HEADACHES: 0
DYSURIA: 0
NAUSEA: 0
EYE PAIN: 0
COUGH: 0

## 2019-10-02 ASSESSMENT — MIFFLIN-ST. JEOR: SCORE: 1440.75

## 2019-10-02 NOTE — PATIENT INSTRUCTIONS
Preventive Health Recommendations  Female Ages 26 - 39  Yearly exam:   See your health care provider every year in order to    Review health changes.     Discuss preventive care.      Review your medicines if you your doctor has prescribed any.    Until age 30: Get a Pap test every three years (more often if you have had an abnormal result).    After age 30: Talk to your doctor about whether you should have a Pap test every 3 years or have a Pap test with HPV screening every 5 years.   You do not need a Pap test if your uterus was removed (hysterectomy) and you have not had cancer.  You should be tested each year for STDs (sexually transmitted diseases), if you're at risk.   Talk to your provider about how often to have your cholesterol checked.  If you are at risk for diabetes, you should have a diabetes test (fasting glucose).  Shots: Get a flu shot each year. Get a tetanus shot every 10 years.   Nutrition:     Eat at least 5 servings of fruits and vegetables each day.    Eat whole-grain bread, whole-wheat pasta and brown rice instead of white grains and rice.    Get adequate Calcium and Vitamin D.     Lifestyle    Exercise at least 150 minutes a week (30 minutes a day, 5 days of the week). This will help you control your weight and prevent disease.    Limit alcohol to one drink per day.    No smoking.     Wear sunscreen to prevent skin cancer.    See your dentist every six months for an exam and cleaning.  Patient Education     Coping with Perimenopause     Being active in ways you enjoy can help you feel better.     For most women, the symptoms of perimenopause subside after entering menopause, although hot flashes and vaginal dryness can continue after periods have stopped. In the meantime, symptoms can be relieved to help you feel better. Leading a healthy lifestyle can keep you feeling your best. The tips below can help. Doing things you enjoy and spending time with people you love are great ways to keep  your spirits up as your body goes through the changes of perimenopause.  Menstrual changes    What happens: As hormone levels go down, periods can become irregular and hard to predict. These changes are often the first sign of perimenopause.    What you can do: Keep pads or tampons on hand in case your period comes unexpectedly. You may also want to talk to your healthcare provider about taking birth control pills to help regulate your periods.  Hot flashes and night sweats    What happens: During a hot flash, you suddenly feel very warm. This may happen often, or just once in a while. Hot flashes at night may interrupt sleep. You may also wake up covered in sweat (night sweats).    What you can do: Wear layers that you can remove. Try all-cotton clothing, sheets, and blankets. At night, open your bedroom window. Keep a glass of water or small fan by your bed in case a hot flash wakes you up.  Bone changes    What happens: Lower levels of estrogen increase your risk of osteoporosis, a disease that weakens the bones. This can cause your bones to break more easily.    What you can do: Eating foods high in calcium and vitamin D helps protect your bones. Your healthcare provider may also suggest taking a calcium supplement. It's also helpful to quit smoking, if you smoke. Don't drink alcohol and get plenty of regular exercise.   Vaginal changes    What happens: In the absence of estrogen, the lining of the vagina can become thin and dry. This can make sex painful. Vaginal infections may also be more likely.    What you can do: Apply a water-based lubricant before having sex. If you are not using condoms or diaphragms for birth control, you can also use silicone-based lubricants, which don't have to be reapplied as often as water-based lubricants. Over-the-counter vaginal moisturizers can be used anytime, not just when you are having sex. You can also talk with your healthcare provider about using a vaginal cream that  contains estrogen.  Mood swings    What happens: As hormone levels decrease, so do chemicals that affect your mood. Hot flashes and trouble sleeping can make mood swings worse. Your sex drive may also decrease.    What you can do: Understand that these feelings are common. Talk to your partner and to other women your age about how you re feeling. Try exercising, which increases levels of mood-boosting chemicals in your body. If your mood swings are affecting your quality of life, talk with your healthcare provider about medicine or other options.  Stay active!  Activity can help you relax and gives you more energy. Exercise also helps keep bones and muscles strong. Staying fit may even give your sex drive a lift. Consider the following:    Weight-bearing activities, such as walking and jogging, help maintain bone density. This can help prevent osteoporosis.    Aerobic activities boost energy by moving oxygen through the body. Walking and jogging are aerobic. You might also try swimming or biking.    Sunlight helps raise levels of brain chemicals that boost your mood. Spending time outdoors can improve your mood, even on a cloudy day. Even a walk around the block can help!  If you re concerned about sex  You may notice that you re not as interested in sex as you used to be. This is very common during perimenopause. Since you re more likely to enjoy sex when you re comfortable, getting your symptoms under control might help. Talk to your healthcare provider about medicines or other options. And remember that there s more to sex than intercourse. Relax and take your time. Talk to your partner about trying new things to help get you aroused.  Date Last Reviewed: 9/1/2017 2000-2018 [employer]. [address] [place] [address], [place] [address]. All rights reserved. This information is not intended as a substitute for professional medical care. Always follow your healthcare professional's instructions.

## 2019-10-02 NOTE — PROGRESS NOTES
Billing the need for me   SUBJECTIVE:   CC: Angeline Aceves is an 39 year old woman who presents for preventive health visit.     Healthy Habits:     Getting at least 3 servings of Calcium per day:  Yes    Bi-annual eye exam:  Yes    Dental care twice a year:  Yes    Sleep apnea or symptoms of sleep apnea:  None    Diet:  Regular (no restrictions)    Frequency of exercise:  4-5 days/week    Duration of exercise:  15-30 minutes    Taking medications regularly:  Yes    Medication side effects:  None    PHQ-2 Total Score: 0    Additional concerns today:  No    Today's PHQ-2 Score:   PHQ-2 ( 1999 Pfizer) 10/2/2019   Q1: Little interest or pleasure in doing things 0   Q2: Feeling down, depressed or hopeless 0   PHQ-2 Score 0   Q1: Little interest or pleasure in doing things Not at all   Q2: Feeling down, depressed or hopeless Not at all   PHQ-2 Score 0       Abuse: Current or Past(Physical, Sexual or Emotional)- No  Do you feel safe in your environment? Yes    Social History     Tobacco Use     Smoking status: Never Smoker     Smokeless tobacco: Never Used   Substance Use Topics     Alcohol use: Yes     Alcohol/week: 1.7 - 2.5 standard drinks     Comment: 3-4 drinks per week     If you drink alcohol do you typically have >3 drinks per day or >7 drinks per week? No    Alcohol Use 10/2/2019   Prescreen: >3 drinks/day or >7 drinks/week? No   Prescreen: >3 drinks/day or >7 drinks/week? -   No flowsheet data found.    Reviewed orders with patient.  Reviewed health maintenance and updated orders accordingly - Yes  Lab work is in process  Labs reviewed in EPIC  BP Readings from Last 3 Encounters:   10/02/19 122/88   07/11/17 115/74   01/27/17 110/78    Wt Readings from Last 3 Encounters:   10/02/19 81.6 kg (180 lb)   07/11/17 78.9 kg (174 lb)   01/27/17 78.9 kg (174 lb)                  Patient Active Problem List   Diagnosis     Unilateral deafness     CARDIOVASCULAR SCREENING; LDL GOAL LESS THAN 160     Large breasts      Headache     Anemia     Irritable bowel syndrome     Other acne     Chronic seasonal allergic rhinitis, unspecified trigger     Past Surgical History:   Procedure Laterality Date     C ANESTH,CLEFT PALATE REPAIR      10 surgeries 2 weeks-16 years of age     ENT SURGERY      Tubes as a child, exploratory on right in '97       Social History     Tobacco Use     Smoking status: Never Smoker     Smokeless tobacco: Never Used   Substance Use Topics     Alcohol use: Yes     Alcohol/week: 1.7 - 2.5 standard drinks     Comment: 3-4 drinks per week     Family History   Problem Relation Age of Onset     Diabetes Maternal Grandfather      Cancer - colorectal Paternal Grandmother      Cerebrovascular Disease Paternal Grandmother      Colon Cancer Paternal Grandmother          Current Outpatient Medications   Medication Sig Dispense Refill     adapalene (DIFFERIN) 0.1 % cream Apply topically At Bedtime       betamethasone dipropionate (DIPROSONE) 0.05 % lotion Apply topically 2 times daily       fluocin-hydroquinone-tretinoin (TRI-LUCIA) 0.01-4-0.05 % CREA        fluticasone (FLONASE) 50 MCG/ACT spray Spray 1-2 sprays into both nostrils daily 1 Bottle 11     loratadine (CLARITIN) 10 MG tablet Take 1 tablet by mouth daily. (Patient taking differently: Take 10 mg by mouth as needed ) 90 tablet 3     selenium sulfide (SELSUN) 2.5 % lotion Apply 1 mL topically three times a week 118 mL 0     No Known Allergies  Recent Labs   Lab Test 07/11/17  0812 09/25/15  0943 06/20/14  1432   * 113 116   HDL 53 44* 45*   TRIG 92 165* 121        Mammogram not appropriate for this patient based on age.    Pertinent mammograms are reviewed under the imaging tab.  History of abnormal Pap smear:   NO - age 30-65 PAP every 5 years with negative HPV co-testing recommended  Last 3 Pap Results:   PAP (no units)   Date Value   09/25/2015 NIL   01/18/2013 NIL   09/27/2010 NIL     PAP / HPV 9/25/2015 1/18/2013 9/27/2010   PAP NIL NIL NIL  "  periods:  Have been changing in frequency.  She recently went 8 weeks between periods.  No hot flashes.  She is not sexually active and she is not concerned about pregnancy today.  Last pap smear 4.5 years ago.  She has never had an abnormal pap.  She is wondering about menopause.     Reviewed and updated as needed this visit by clinical staff  Tobacco  Allergies  Meds  Med Hx  Surg Hx  Fam Hx  Soc Hx        Reviewed and updated as needed this visit by Provider            Review of Systems   Constitutional: Negative for chills and fever.   HENT: Negative for congestion, ear pain, hearing loss and sore throat.    Eyes: Negative for pain and visual disturbance.   Respiratory: Negative for cough and shortness of breath.    Cardiovascular: Negative for chest pain, palpitations and peripheral edema.   Gastrointestinal: Negative for abdominal pain, constipation, diarrhea, heartburn, hematochezia and nausea.   Breasts:  Negative for tenderness, breast mass and discharge.   Genitourinary: Negative for dysuria, frequency, genital sores, hematuria, pelvic pain, urgency, vaginal bleeding and vaginal discharge.   Musculoskeletal: Negative for arthralgias, joint swelling and myalgias.   Skin: Negative for rash.   Neurological: Negative for dizziness, weakness, headaches and paresthesias.   Psychiatric/Behavioral: Negative for mood changes. The patient is not nervous/anxious.         OBJECTIVE:   /88 (BP Location: Right arm, Patient Position: Sitting, Cuff Size: Adult Regular)   Pulse 77   Temp 97.8  F (36.6  C) (Oral)   Resp 16   Ht 1.568 m (5' 1.75\")   Wt 81.6 kg (180 lb)   LMP 09/21/2019 (Exact Date)   SpO2 99%   BMI 33.19 kg/m    Physical Exam  GENERAL: healthy, alert and no distress  EYES: Eyes grossly normal to inspection, PERRL and conjunctivae and sclerae normal  HENT: ear canals and TM's normal, nose and mouth without ulcers or lesions  NECK: no adenopathy, no asymmetry, masses, or scars and thyroid " normal to palpation  RESP: lungs clear to auscultation - no rales, rhonchi or wheezes  BREAST: normal without masses, tenderness or nipple discharge and no palpable axillary masses or adenopathy  CV: regular rate and rhythm, normal S1 S2, no S3 or S4, no murmur, click or rub, no peripheral edema and peripheral pulses strong  ABDOMEN: soft, nontender, no hepatosplenomegaly, no masses and bowel sounds normal   (female): normal female external genitalia, normal urethral meatus, vaginal mucosa pink, moist, well rugated, and normal cervix/adnexa/uterus without masses or discharge  MS: no gross musculoskeletal defects noted, no edema  SKIN: no suspicious lesions or rashes  NEURO: Normal strength and tone, mentation intact and speech normal  PSYCH: mentation appears normal, affect normal/bright    Diagnostic Test Results:  Labs reviewed in Epic  Results for orders placed or performed in visit on 10/02/19 (from the past 24 hour(s))   Hemoglobin   Result Value Ref Range    Hemoglobin 12.2 11.7 - 15.7 g/dL   Other labs drawn/results pending    ASSESSMENT/PLAN:   (Z00.00) Routine general medical examination at a health care facility  (primary encounter diagnosis)  Comment:   Plan: Pap imaged thin layer screen with HPV -         recommended age 30 - 65 years (select HPV order        below), HPV High Risk Types DNA Cervical, Lipid        panel reflex to direct LDL Fasting, Hemoglobin,        Glucose            (N92.6) Irregular periods  Comment:   Plan: I talked to Angeline about perimenopause, menopause, PCOS, uterine fibroids etc.  For now, her symptoms do seem to be consistent with perimenopause.  We discussed and considered labs (FSH, LH) but decided to wait today.  She is slightly concerned about fertility as she has never had children.  For now, she is comfortable monitoring her periods.  I did tell her about the importance of getting menstrual cycles/period every 3 months.  She will let me know if she goes longer than 3  "months between periods.    In the event that she wants a referral to follow-up with OB/GYN, I told her just to my chart message me and I would give her that referral.  Otherwise, she is return to clinic to see me anytime if her cycles change or become more concerning or she should follow-up with OB/GYN.        (Z12.4) Screening for cervical cancer  Comment: Routine  Plan: Done today.  Anticipate next Pap in 5 years sooner if today's Pap smear is abnormal.    (Z13.0) Screening for iron deficiency anemia  Comment: Routine  Plan: Hemoglobin        Done today    (Z13.220) Screening for hyperlipidemia  Comment: Routine  Plan: Lipid panel reflex to direct LDL Fasting        Done today    (Z13.1) Screening for diabetes mellitus  Comment: Routine  Plan: Glucose        Done today      COUNSELING:  Reviewed preventive health counseling, as reflected in patient instructions    Estimated body mass index is 33.19 kg/m  as calculated from the following:    Height as of this encounter: 1.568 m (5' 1.75\").    Weight as of this encounter: 81.6 kg (180 lb).    Weight management plan: Discussed healthy diet and exercise guidelines     reports that she has never smoked. She has never used smokeless tobacco.      Counseling Resources:  ATP IV Guidelines  Pooled Cohorts Equation Calculator  Breast Cancer Risk Calculator  FRAX Risk Assessment  ICSI Preventive Guidelines  Dietary Guidelines for Americans, 2010  USDA's MyPlate  ASA Prophylaxis  Lung CA Screening    HEDY Huitron [facility]  "

## 2019-10-03 LAB
CHOLEST SERPL-MCNC: 211 MG/DL
GLUCOSE SERPL-MCNC: 84 MG/DL (ref 70–99)
HDLC SERPL-MCNC: 44 MG/DL
LDLC SERPL CALC-MCNC: 137 MG/DL
NONHDLC SERPL-MCNC: 167 MG/DL
TRIGL SERPL-MCNC: 149 MG/DL

## 2019-10-03 NOTE — RESULT ENCOUNTER NOTE
Garth Marcus,    This note is to let you know the results of your recent lab studies.    Your blood count/hemoglobin and blood sugar/glucose are normal.    Your cholesterol levels are mildly elevated. I recommend a diet low in fat and cholesterol as well as regular aerobic activity. I would like to recheck your cholesterol in one year.    Me know if you have any questions.  Otherwise I look forward to seeing one year.    Tri KNOTT CNP

## 2019-10-07 LAB
COPATH REPORT: NORMAL
PAP: NORMAL

## 2019-10-09 LAB
FINAL DIAGNOSIS: NORMAL
HPV HR 12 DNA CVX QL NAA+PROBE: NEGATIVE
HPV16 DNA SPEC QL NAA+PROBE: NEGATIVE
HPV18 DNA SPEC QL NAA+PROBE: NEGATIVE
SPECIMEN DESCRIPTION: NORMAL
SPECIMEN SOURCE CVX/VAG CYTO: NORMAL

## 2020-11-04 ENCOUNTER — E-VISIT (OUTPATIENT)
Dept: URGENT CARE | Facility: CLINIC | Age: 40
End: 2020-11-04
Payer: COMMERCIAL

## 2020-11-04 DIAGNOSIS — Z20.822 CLOSE EXPOSURE TO 2019 NOVEL CORONAVIRUS: Primary | ICD-10-CM

## 2020-11-04 PROCEDURE — 99421 OL DIG E/M SVC 5-10 MIN: CPT | Performed by: FAMILY MEDICINE

## 2020-11-04 NOTE — PATIENT INSTRUCTIONS
Dear Angeline Aceves,    Based on your exposure to COVID-19 (coronavirus), we would like to test you for this virus. I have placed an order for this test and please call [phone] to schedule testing. The optimal time to test after exposure is 5-7 days from the exposure.    If you know you have had close contact with someone who tested positive, you should be quarantined for 14 days after this exposure. You should stay in quarantine for the14 days even if the covid test is negative.     Quarantine means:  Stay home and away from others. Don't go to school or anywhere else. Generally quarantine means staying home from work but there are some exceptions to this. Please contact your workplace.  No hugging, kissing or shaking hands.  Don't let anyone visit.  Cover your mouth and nose with a mask, tissue or washcloth to avoid spreading germs.  Wash your hands and face often. Use soap and water.    What are the symptoms of COVID-19?  The most common symptoms are cough, fever and trouble breathing. Less common symptoms include headache, body aches, fatigue (feeling very tired), chills, sore throat, stuffy or runny nose, diarrhea (loose poop), loss of taste or smell, belly pain, and nausea or vomiting (feeling sick to your stomach or throwing up).  After 14 days, if you have still don't have symptoms, you likely don't have this virus.  If you develop symptoms, follow these guidelines.  If you're normally healthy: Please start another [facility] visit to report your symptoms. Go to [email or web address].  If you have a serious health problem (like cancer, heart failure, an organ transplant or kidney disease): Call your specialty clinic. Let them know that you might have COVID-19.    When it's time for your COVID test:  Stay at least 6 feet away from others. (If someone will drive you to your test, stay in the backseat, as far away from the  as you can.)  Cover your mouth and nose with a mask, tissue or washcloth.  Go straight  to the testing site. Don't make any stops on the way there or back.    Please note  Patients in these groups are at risk for severe illness due to COVID-19:    People 65 years and older    People who live in a nursing home or long-term care facility    People with chronic disease (lung, heart, cancer, diabetes, kidney, liver, immunologic)    People who have a weakened immune system, including those who:  o Are in cancer treatment  o Take medicine that weakens the immune system, such as corticosteroids  o Had a bone marrow or organ transplant  o Have an immune deficiency  o Have poorly controlled HIV or AIDS  o Are obese (body mass index of 40 or higher)  o Smoke regularly    Where can I get more information?  [facility] - About COVID-19: [email or web address]  CDC - What to Do If You're Sick: www.cdc.gov/coronavirus/2019-ncov/about/steps-when-sick.html  CDC - Ending Home Isolation: www.cdc.gov/coronavirus/2019-ncov/hcp/disposition-in-home-patients.html  [facility] - Caring for Someone: www.cdc.gov/coronavirus/2019-ncov/if-you-are-sick/care-for-someone.html  [facility] - Interim Guidance for Hospital Discharge to Home: www.health.[facility].mn.us/diseases/coronavirus/hcp/hospdischarge.pdf  [facility] clinical trials (COVID-19 research studies): [email or web address].[facility]-clinical-trials  Below are the COVID-19 hotlines at the [facility] ([facility]). Interpreters are available.  For health questions: Call [phone] or [phone] (7 a.m. to 7 p.m.)  For questions about schools and childcare: Call [phone] or [phone] (7 a.m. to 7 p.m.)

## 2020-11-14 ENCOUNTER — HEALTH MAINTENANCE LETTER (OUTPATIENT)
Age: 40
End: 2020-11-14

## 2020-11-15 ENCOUNTER — OFFICE VISIT - HEALTHEAST (OUTPATIENT)
Dept: FAMILY MEDICINE | Facility: CLINIC | Age: 40
End: 2020-11-15

## 2020-11-15 ENCOUNTER — AMBULATORY - HEALTHEAST (OUTPATIENT)
Dept: FAMILY MEDICINE | Facility: CLINIC | Age: 40
End: 2020-11-15

## 2020-11-15 DIAGNOSIS — Z20.822 CLOSE EXPOSURE TO COVID-19 VIRUS: ICD-10-CM

## 2020-11-18 ENCOUNTER — COMMUNICATION - HEALTHEAST (OUTPATIENT)
Dept: SCHEDULING | Facility: CLINIC | Age: 40
End: 2020-11-18

## 2020-12-08 ASSESSMENT — ENCOUNTER SYMPTOMS
CONSTIPATION: 0
SHORTNESS OF BREATH: 0
ABDOMINAL PAIN: 0
HEARTBURN: 0
SORE THROAT: 0
HEADACHES: 0
DYSURIA: 0
PARESTHESIAS: 0
PALPITATIONS: 0
ARTHRALGIAS: 0
CHILLS: 0
HEMATURIA: 0
NERVOUS/ANXIOUS: 0
JOINT SWELLING: 0
FREQUENCY: 0
NAUSEA: 0
WEAKNESS: 0
BREAST MASS: 0
COUGH: 0
HEMATOCHEZIA: 0
FEVER: 0
DIZZINESS: 0
EYE PAIN: 0
MYALGIAS: 0
DIARRHEA: 0

## 2020-12-09 ENCOUNTER — OFFICE VISIT (OUTPATIENT)
Dept: FAMILY MEDICINE | Facility: CLINIC | Age: 40
End: 2020-12-09
Payer: COMMERCIAL

## 2020-12-09 VITALS
OXYGEN SATURATION: 99 % | DIASTOLIC BLOOD PRESSURE: 68 MMHG | BODY MASS INDEX: 32.94 KG/M2 | TEMPERATURE: 99 F | HEART RATE: 87 BPM | WEIGHT: 179 LBS | RESPIRATION RATE: 16 BRPM | SYSTOLIC BLOOD PRESSURE: 108 MMHG | HEIGHT: 62 IN

## 2020-12-09 DIAGNOSIS — U07.1 2019 NOVEL CORONAVIRUS DISEASE (COVID-19): ICD-10-CM

## 2020-12-09 DIAGNOSIS — Z00.00 ROUTINE GENERAL MEDICAL EXAMINATION AT A HEALTH CARE FACILITY: Primary | ICD-10-CM

## 2020-12-09 DIAGNOSIS — N92.0 MENORRHAGIA WITH REGULAR CYCLE: ICD-10-CM

## 2020-12-09 PROCEDURE — 86769 SARS-COV-2 COVID-19 ANTIBODY: CPT | Performed by: NURSE PRACTITIONER

## 2020-12-09 PROCEDURE — 36415 COLL VENOUS BLD VENIPUNCTURE: CPT | Performed by: NURSE PRACTITIONER

## 2020-12-09 PROCEDURE — 99396 PREV VISIT EST AGE 40-64: CPT | Performed by: NURSE PRACTITIONER

## 2020-12-09 ASSESSMENT — ENCOUNTER SYMPTOMS
WEAKNESS: 0
FEVER: 0
PARESTHESIAS: 0
PALPITATIONS: 0
DIZZINESS: 0
MYALGIAS: 0
HEARTBURN: 0
HEMATURIA: 0
SORE THROAT: 0
CHILLS: 0
NERVOUS/ANXIOUS: 0
HEMATOCHEZIA: 0
DIARRHEA: 0
COUGH: 0
EYE PAIN: 0
DYSURIA: 0
NAUSEA: 0
FREQUENCY: 0
JOINT SWELLING: 0
ARTHRALGIAS: 0
CONSTIPATION: 0
SHORTNESS OF BREATH: 0
ABDOMINAL PAIN: 0
HEADACHES: 0
BREAST MASS: 0

## 2020-12-09 ASSESSMENT — MIFFLIN-ST. JEOR: SCORE: 1427.25

## 2020-12-09 NOTE — PROGRESS NOTES
SUBJECTIVE:   CC: Angeline Aceves is an 40 year old woman who presents for preventive health visit.       Patient has been advised of split billing requirements and indicates understanding: Yes  Healthy Habits:     Getting at least 3 servings of Calcium per day:  Yes    Bi-annual eye exam:  Yes    Dental care twice a year:  Yes    Sleep apnea or symptoms of sleep apnea:  None    Diet:  Regular (no restrictions)    Frequency of exercise:  4-5 days/week    Taking medications regularly:  Yes    Barriers to taking medications:  None    Medication side effects:  None    PHQ-2 Total Score: 0    Additional concerns today:  Yes    Today's PHQ-2 Score:   PHQ-2 ( 1999 Pfizer) 12/8/2020   Q1: Little interest or pleasure in doing things 0   Q2: Feeling down, depressed or hopeless 0   PHQ-2 Score 0   Q1: Little interest or pleasure in doing things Not at all   Q2: Feeling down, depressed or hopeless Not at all   PHQ-2 Score 0       Abuse: Current or Past (Physical, Sexual or Emotional) - No  Do you feel safe in your environment? Yes    Social History     Tobacco Use     Smoking status: Never Smoker     Smokeless tobacco: Never Used   Substance Use Topics     Alcohol use: Yes     Alcohol/week: 1.7 - 2.5 standard drinks     Comment: 3-4 drinks per week     If you drink alcohol do you typically have >3 drinks per day or >7 drinks per week? No    No flowsheet data found.    Reviewed orders with patient.  Reviewed health maintenance and updated orders accordingly - Yes  Lab work is in process  Labs reviewed in EPIC  BP Readings from Last 3 Encounters:   12/09/20 108/68   10/02/19 122/88   07/11/17 115/74    Wt Readings from Last 3 Encounters:   12/09/20 81.2 kg (179 lb)   10/02/19 81.6 kg (180 lb)   07/11/17 78.9 kg (174 lb)                  Patient Active Problem List   Diagnosis     Unilateral deafness     CARDIOVASCULAR SCREENING; LDL GOAL LESS THAN 160     Large breasts     Headache     Anemia     Irritable bowel syndrome      Other acne     Chronic seasonal allergic rhinitis, unspecified trigger     Past Surgical History:   Procedure Laterality Date     C ANESTH,CLEFT PALATE REPAIR      10 surgeries 2 weeks-16 years of age     ENT SURGERY      Tubes as a child, exploratory on right in '97       Social History     Tobacco Use     Smoking status: Never Smoker     Smokeless tobacco: Never Used   Substance Use Topics     Alcohol use: Yes     Alcohol/week: 1.7 - 2.5 standard drinks     Comment: 3-4 drinks per week     Family History   Problem Relation Age of Onset     Diabetes Maternal Grandfather      Cancer - colorectal Paternal Grandmother      Cerebrovascular Disease Paternal Grandmother      Colon Cancer Paternal Grandmother          Current Outpatient Medications   Medication Sig Dispense Refill     adapalene (DIFFERIN) 0.1 % cream Apply topically At Bedtime       betamethasone dipropionate (DIPROSONE) 0.05 % lotion Apply topically 2 times daily       fluocin-hydroquinone-tretinoin (TRI-LUCIA) 0.01-4-0.05 % CREA        fluticasone (FLONASE) 50 MCG/ACT spray Spray 1-2 sprays into both nostrils daily 1 Bottle 11     loratadine (CLARITIN) 10 MG tablet Take 1 tablet by mouth daily. (Patient taking differently: Take 10 mg by mouth as needed ) 90 tablet 3     selenium sulfide (SELSUN) 2.5 % lotion Apply 1 mL topically three times a week 118 mL 0     No Known Allergies  Recent Labs   Lab Test 10/02/19  1417 07/11/17  0812 09/25/15  0943   * 131* 113   HDL 44* 53 44*   TRIG 149 92 165*        Mammogram Screening: Patient under age 50, mutual decision reflected in health maintenance.      Pertinent mammograms are reviewed under the imaging tab.  History of abnormal Pap smear:   NO - age 30- 65 PAP every 3 years recommended  Last 3 Pap and HPV Results:   PAP / HPV Latest Ref Rng & Units 10/2/2019 9/25/2015 1/18/2013   PAP - NIL NIL NIL   HPV 16 DNA NEG:Negative Negative - -   HPV 18 DNA NEG:Negative Negative - -   OTHER HR HPV  "NEG:Negative Negative - -     PAP / HPV Latest Ref Rng & Units 10/2/2019 9/25/2015 1/18/2013   PAP - NIL NIL NIL   HPV 16 DNA NEG:Negative Negative - -   HPV 18 DNA NEG:Negative Negative - -   OTHER HR HPV NEG:Negative Negative - -     No history of abnormal paps.  Periods: getting heavier.   She does not necessarily consider this a problem.      Reviewed and updated as needed this visit by clinical staff  Tobacco  Allergies  Meds   Med Hx  Surg Hx  Fam Hx  Soc Hx        Reviewed and updated as needed this visit by Provider                Covid infection   Covid positive 11/15/2020.  She got over Covid easily.  Today she is requesting Covid antibody testing.    Review of Systems   Constitutional: Negative for chills and fever.   HENT: Negative for congestion, ear pain, hearing loss and sore throat.    Eyes: Negative for pain and visual disturbance.   Respiratory: Negative for cough and shortness of breath.    Cardiovascular: Negative for chest pain, palpitations and peripheral edema.   Gastrointestinal: Negative for abdominal pain, constipation, diarrhea, heartburn, hematochezia and nausea.   Breasts:  Negative for tenderness, breast mass and discharge.   Genitourinary: Positive for vaginal bleeding. Negative for dysuria, frequency, genital sores, hematuria, pelvic pain, urgency and vaginal discharge.   Musculoskeletal: Negative for arthralgias, joint swelling and myalgias.   Skin: Negative for rash.   Neurological: Negative for dizziness, weakness, headaches and paresthesias.   Psychiatric/Behavioral: Negative for mood changes. The patient is not nervous/anxious.         OBJECTIVE:   /68 (BP Location: Left arm, Patient Position: Sitting, Cuff Size: Adult Regular)   Pulse 87   Temp 99  F (37.2  C) (Oral)   Resp 16   Ht 1.562 m (5' 1.5\")   Wt 81.2 kg (179 lb)   LMP 11/29/2020 (Exact Date)   SpO2 99%   Breastfeeding No   BMI 33.27 kg/m    Physical Exam  GENERAL: healthy, alert and no " distress  EYES: Eyes grossly normal to inspection, PERRL and conjunctivae and sclerae normal  HENT: ear canals and TM's normal, nose and mouth without ulcers or lesions  NECK: no adenopathy, no asymmetry, masses, or scars and thyroid normal to palpation  RESP: lungs clear to auscultation - no rales, rhonchi or wheezes  BREAST: normal without masses, tenderness or nipple discharge and no palpable axillary masses or adenopathy  CV: regular rate and rhythm, normal S1 S2, no S3 or S4, no murmur, click or rub, no peripheral edema and peripheral pulses strong  ABDOMEN: soft, nontender, no hepatosplenomegaly, no masses and bowel sounds normal  MS: no gross musculoskeletal defects noted, no edema  SKIN: no suspicious lesions or rashes  NEURO: Normal strength and tone, mentation intact and speech normal  PSYCH: mentation appears normal, affect normal/bright    Diagnostic Test Results:  Labs reviewed in [facility]  Diagnostics ordered today    ASSESSMENT/PLAN:     (Z00.00) Routine general medical examination at a health care facility  (primary encounter diagnosis)  Comment:   Plan: COVID-19 Virus (Coronavirus) Antibody & Titer         Reflex            (U07.1) 2019 novel coronavirus disease (COVID-19), history of  Comment: Now resolved  Plan: COVID-19 Virus (Coronavirus) Antibody & Titer         Reflex        I did go ahead and have her do the antibody test today.  I did tell her regardless of the outcome of this test at this time she should have the coronavirus vaccine when it is available.  She verbalized understanding.    (N92.0) Menorrhagia with regular cycle  Comment: Tolerating well/etiology uncertain  Plan: For today, I encouraged her to eat a healthy diet, iron rich sources etc.  In the event that her menstrual cycles worsen or bother her, I did tell her I would order an pelvic ultrasound for her.  In the event that she wants that pelvic ultrasound, she will MyChart message me and I will order it.  She is appreciative.  She  "did decline a pelvic ultrasound today.    Patient has been advised of split billing requirements and indicates understanding: Yes  COUNSELING:  Reviewed preventive health counseling, as reflected in patient instructions    Estimated body mass index is 33.27 kg/m  as calculated from the following:    Height as of this encounter: 1.562 m (5' 1.5\").    Weight as of this encounter: 81.2 kg (179 lb).    Weight management plan: Discussed healthy diet and exercise guidelines    She reports that she has never smoked. She has never used smokeless tobacco.      Counseling Resources:  ATP IV Guidelines  Pooled Cohorts Equation Calculator  Breast Cancer Risk Calculator  BRCA-Related Cancer Risk Assessment: FHS-7 Tool  FRAX Risk Assessment  ICSI Preventive Guidelines  Dietary Guidelines for Americans, 2010  USDA's MyPlate  ASA Prophylaxis  Lung CA Screening    HEDY Huitron [facility]  "

## 2020-12-09 NOTE — PATIENT INSTRUCTIONS
Preventive Health Recommendations  Female Ages 40 to 49    Yearly exam:     See your health care provider every year in order to  1. Review health changes.   2. Discuss preventive care.    3. Review your medicines if your doctor prescribed any.      Get a Pap test every three years (unless you have an abnormal result and your provider advises testing more often).      If you get Pap tests with HPV test, you only need to test every 5 years, unless you have an abnormal result. You do not need a Pap test if your uterus was removed (hysterectomy) and you have not had cancer.      You should be tested each year for STDs (sexually transmitted diseases), if you're at risk.     Ask your doctor if you should have a mammogram.      Have a colonoscopy (test for colon cancer) if someone in your family has had colon cancer or polyps before age 50.       Have a cholesterol test every 5 years.       Have a diabetes test (fasting glucose) after age 45. If you are at risk for diabetes, you should have this test every 3 years.    Shots: Get a flu shot each year. Get a tetanus shot every 10 years.     Nutrition:     Eat at least 5 servings of fruits and vegetables each day.    Eat whole-grain bread, whole-wheat pasta and brown rice instead of white grains and rice.    Get adequate Calcium and Vitamin D.      Lifestyle    Exercise at least 150 minutes a week (an average of 30 minutes a day, 5 days a week). This will help you control your weight and prevent disease.    Limit alcohol to one drink per day.    No smoking.     Wear sunscreen to prevent skin cancer.    See your dentist every six months for an exam and cleaning.    Patient Education     Perimenopause  Menopause is when you stop having periods for good. For many women, this happens around age 50. The time of change before this is called perimenopause. It may start in your late 30s or 40s. It can last for months or years. During this time, your body makes lower levels of  female hormones. This causes certain changes in your body. You may already have begun to feel the effects of these changes. By taking steps to relieve symptoms, you can still feel good.    The menstrual cycle  Hormones are chemicals that have specific jobs in the body. A menstrual cycle is caused by changes in the levels of 2 female hormones. These hormones are estrogen and progesterone. They are made by the ovaries. In a normal cycle, estrogen creates a lining in the uterus to allow for pregnancy. The ovary then releases an egg. This is called ovulation. Progesterone levels start to go up. If the egg is not fertilized, progesterone levels go down. This causes the lining in the uterus to be shed. This is the bleeding that is your period.  Changes in hormones  As a woman ages, her ovaries begin to make hormones less regularly. In some months, there may not be ovulation. Without ovulation, progesterone isn't secreted so a normal period doesn't occur. The menstrual cycle will be harder to predict. Over time, the ovaries stop working. This can cause symptoms. Some women who have had their uterus taken out (hysterectomy) but still have ovaries may still have symptoms. When estrogen levels reach their lowest point, periods will stop completely. This is menopause.  Symptoms of perimenopause  The change in hormones can cause physical symptoms. It can also cause emotional symptoms. These may include:    Periods that come more or less often    Periods that are lighter or heavier than you re used to    Hot flashes, night sweats, or trouble sleeping    Vaginal dryness, which may make sex painful    Mood swings or fatigue    Palpitations    Sleep disturbances    Urinary symptoms including frequency and incontinence  Medicines that may help  Some medicines can help ease the effects of perimenopause. These include:    Low-dose birth control pills. These often contain both estrogen and progesterone. They can help regulate your  periods.    Hormone therapy (HT). This replaces some of the hormones your body has stopped making.    Antidepressants. These help balance brain chemicals that may decrease during this time. Signs of depression can include often feeling sad or hopeless. If you feel this way, be sure to talk to your healthcare provider.    Gabapentin. This is a medicine also used to treat seizures. This controls hot flashes and night sweats in some women.    Clonidine. This medicine may control hot flashes and night sweats.  [employer] last reviewed this educational content on 11/1/2017 2000-2020 [employer]. [address] [place] [id number]. All rights reserved. This information is not intended as a substitute for professional medical care. Always follow your healthcare professional's instructions.

## 2020-12-10 LAB
COVID-19 SPIKE RBD ABY TITER: NORMAL
COVID-19 SPIKE RBD ABY: NEGATIVE

## 2020-12-11 NOTE — RESULT ENCOUNTER NOTE
Garth Marcus,    This note is to let you know that your Covid antibody test came back negative.  This means there is no antibody response to an infection.  Make sure to maintain Covid precautions and I do recommend the vaccine when it becomes available.  Let me know if you have any questions.    Tri KNOTT CNP

## 2021-01-27 ENCOUNTER — HOSPITAL ENCOUNTER (EMERGENCY)
Facility: CLINIC | Age: 41
Discharge: HOME | End: 2021-01-27
Attending: EMERGENCY MEDICINE | Admitting: EMERGENCY MEDICINE
Payer: COMMERCIAL

## 2021-01-27 ENCOUNTER — APPOINTMENT (OUTPATIENT)
Dept: GENERAL RADIOLOGY | Facility: CLINIC | Age: 41
End: 2021-01-27
Attending: EMERGENCY MEDICINE
Payer: COMMERCIAL

## 2021-01-27 VITALS
HEART RATE: 90 BPM | SYSTOLIC BLOOD PRESSURE: 133 MMHG | BODY MASS INDEX: 32.53 KG/M2 | OXYGEN SATURATION: 99 % | DIASTOLIC BLOOD PRESSURE: 93 MMHG | WEIGHT: 175 LBS | TEMPERATURE: 99.2 F | RESPIRATION RATE: 18 BRPM

## 2021-01-27 DIAGNOSIS — S82.201A CLOSED FRACTURE OF RIGHT TIBIA AND FIBULA, INITIAL ENCOUNTER: ICD-10-CM

## 2021-01-27 DIAGNOSIS — S82.401A CLOSED FRACTURE OF RIGHT TIBIA AND FIBULA, INITIAL ENCOUNTER: ICD-10-CM

## 2021-01-27 PROCEDURE — 99284 EMERGENCY DEPT VISIT MOD MDM: CPT | Mod: 25 | Performed by: EMERGENCY MEDICINE

## 2021-01-27 PROCEDURE — 27780 TREATMENT OF FIBULA FRACTURE: CPT | Mod: 54 | Performed by: EMERGENCY MEDICINE

## 2021-01-27 PROCEDURE — 250N000013 HC RX MED GY IP 250 OP 250 PS 637: Performed by: EMERGENCY MEDICINE

## 2021-01-27 PROCEDURE — 73610 X-RAY EXAM OF ANKLE: CPT | Mod: RT

## 2021-01-27 PROCEDURE — 27780 TREATMENT OF FIBULA FRACTURE: CPT | Mod: RT | Performed by: EMERGENCY MEDICINE

## 2021-01-27 RX ORDER — IBUPROFEN 600 MG/1
600 TABLET, FILM COATED ORAL ONCE
Status: COMPLETED | OUTPATIENT
Start: 2021-01-27 | End: 2021-01-27

## 2021-01-27 RX ORDER — IBUPROFEN 600 MG/1
600 TABLET, FILM COATED ORAL EVERY 6 HOURS PRN
Qty: 24 TABLET | Refills: 0 | Status: SHIPPED | OUTPATIENT
Start: 2021-01-27 | End: 2022-04-14

## 2021-01-27 RX ADMIN — IBUPROFEN 600 MG: 600 TABLET, FILM COATED ORAL at 22:05

## 2021-01-27 ASSESSMENT — ENCOUNTER SYMPTOMS: NUMBNESS: 0

## 2021-01-28 ENCOUNTER — VIRTUAL VISIT (OUTPATIENT)
Dept: ORTHOPEDICS | Facility: CLINIC | Age: 41
End: 2021-01-28
Payer: COMMERCIAL

## 2021-01-28 DIAGNOSIS — S82.891A CLOSED FRACTURE OF RIGHT ANKLE, INITIAL ENCOUNTER: Primary | ICD-10-CM

## 2021-01-28 PROCEDURE — 99202 OFFICE O/P NEW SF 15 MIN: CPT | Mod: GT | Performed by: FAMILY MEDICINE

## 2021-01-28 NOTE — DISCHARGE INSTRUCTIONS
Please use your AirCast and Crutches for comfort.  Take Tylenol or Aleve for pain.  Follow up with your primary care provider or orthopedist in the next 1-2 weeks.

## 2021-01-28 NOTE — LETTER
1/28/2021         RE: Angeline Aceves  [address] [address]        Dear Colleague,    Thank you for referring your patient, Angeline Aceves, to the [facility]. Please see a copy of my visit note below.          SPORTS & ORTHOPEDIC WALK-IN CLINIC VISIT 1/28/2021 1/27/21  Was walking home from work and slipped on ice.   Mentions inversion with weight falling on her R ankle.    Was not initially terrible. Continued to walk home but worsened quickly. Seen in ED and dx with distal fibular fx. Received a boot and crutches. Has increased swelling today. Pain well controlled. Has questions about management, surgical vs nonsurgical.     Imaging:   Reviewed imaging of right ankle demonstrating oblique fibular fracture at the level of the mortise.     A/P 40 F with distal fibular fracture    Discussed that treatment options would likely be 6 weeks NWB in boot vs surgery.   Recommended she follow up with Dr. Hoover if she has questions about surgical management.      Fernando Ramirez MD      Angeline is a 40 year old who is being evaluated via a billable video visit.      How would you like to obtain your AVS? MyChart  If the video visit is dropped, the invitation should be resent by: Send to e-mail at: [email or web address]  Will anyone else be joining your video visit? No      Video Start Time: 12:41  Video-Visit Details    Type of service:  Video Visit    Video End Time:12:53 PM    Originating Location (pt. Location): Home    Distant Location (provider location):  [facility]     Platform used for Video Visit: [employer]

## 2021-01-28 NOTE — PROGRESS NOTES
SPORTS & ORTHOPEDIC WALK-IN CLINIC VISIT 1/28/2021 1/27/21  Was walking home from work and slipped on ice.   Mentions inversion with weight falling on her R ankle.    Was not initially terrible. Continued to walk home but worsened quickly. Seen in ED and dx with distal fibular fx. Received a boot and crutches. Has increased swelling today. Pain well controlled. Has questions about management, surgical vs nonsurgical.     Imaging:   Reviewed imaging of right ankle demonstrating oblique fibular fracture at the level of the mortise.     A/P 40 F with distal fibular fracture    Discussed that treatment options would likely be 6 weeks NWB in boot vs surgery.   Recommended she follow up with Dr. Hoover if she has questions about surgical management.      Fernando Ramirez MD      Angeline is a 40 year old who is being evaluated via a billable video visit.      How would you like to obtain your AVS? MyChart  If the video visit is dropped, the invitation should be resent by: Send to e-mail at: [email or web address]  Will anyone else be joining your video visit? No      Video Start Time: 12:41  Video-Visit Details    Type of service:  Video Visit    Video End Time:12:53 PM    Originating Location (pt. Location): Home    Distant Location (provider location):  [facility]THOP[facility] CLINIC [place]     Platform used for Video Visit: [employer]

## 2021-01-28 NOTE — ED PROVIDER NOTES
ED Provider Note  [facility]      History     Chief Complaint   Patient presents with     Joint Swelling     Right ankle pain due to fall. Swollen and tender. Denies numbness and tingling.     The history is provided by the patient and medical records.     Angeline presents to the ER with right ankle pain after a fall.  She fell prior to arrival and has been unable to bear weight.  She has no head or neck injury.  She has no prior injury to this ankle.  She denies any numbness.      Past Medical History  Past Medical History:   Diagnosis Date     Headache 6/20/2014     Headache      Headache      Irritable bowel syndrome 9/25/2015     Unilateral deafness 2/12/2009    Left ear (Problem list name updated by automated process. Provider to review and confirm.)     Past Surgical History:   Procedure Laterality Date     C ANESTH,CLEFT PALATE REPAIR      10 surgeries 2 weeks-16 years of age     ENT SURGERY      Tubes as a child, exploratory on right in '97          adapalene (DIFFERIN) 0.1 % cream       betamethasone dipropionate (DIPROSONE) 0.05 % lotion       fluocin-hydroquinone-tretinoin (TRI-LUCIA) 0.01-4-0.05 % CREA       fluticasone (FLONASE) 50 MCG/ACT spray       loratadine (CLARITIN) 10 MG tablet       selenium sulfide (SELSUN) 2.5 % lotion      No Known Allergies  Family History  Family History   Problem Relation Age of Onset     Diabetes Maternal Grandfather      Cancer - colorectal Paternal Grandmother      Cerebrovascular Disease Paternal Grandmother      Colon Cancer Paternal Grandmother      Social History   Social History     Tobacco Use     Smoking status: Never Smoker     Smokeless tobacco: Never Used   Substance Use Topics     Alcohol use: Yes     Alcohol/week: 1.7 - 2.5 standard drinks     Comment: 3-4 drinks per week     Drug use: No      Past medical history, past surgical history, medications, allergies, family history, and social history were reviewed with the patient. No  additional pertinent items.       Review of Systems   Musculoskeletal:        Positive for right ankle pain and swelling   Neurological: Negative for numbness.        Negative for tingling.   All other systems reviewed and are negative.    A complete review of systems was performed with pertinent positives and negatives noted in the HPI, and all other systems negative.    Physical Exam   BP: 134/58  Pulse: 84  Temp: 99.2  F (37.3  C)  Resp: 16  Weight: 79.4 kg (175 lb)  SpO2: 99 %  Physical Exam  Vitals signs and nursing note reviewed.   Constitutional:       General: She is not in acute distress.     Appearance: She is not diaphoretic.   HENT:      Head: Normocephalic and atraumatic.   Eyes:      Conjunctiva/sclera: Conjunctivae normal.      Pupils: Pupils are equal, round, and reactive to light.   Neck:      Musculoskeletal: Normal range of motion and neck supple.   Cardiovascular:      Rate and Rhythm: Normal rate.   Pulmonary:      Effort: Pulmonary effort is normal. No respiratory distress.   Musculoskeletal: Normal range of motion.         General: Swelling present.      Comments: There is tenderness to the medial and lateral malleolus, there is diffuse swelling, distal pulses intact   Skin:     General: Skin is warm and dry.      Coloration: Skin is not pale.   Neurological:      Mental Status: She is alert and oriented to person, place, and time. Mental status is at baseline.      Sensory: No sensory deficit.      Gait: Gait abnormal.   Psychiatric:         Behavior: Behavior normal.         Thought Content: Thought content normal.         Judgment: Judgment normal.       ED Course      Procedures                      Results for orders placed or performed during the hospital encounter of 01/27/21   Ankle XR, G/E 3 views, right     Status: None    Narrative    EXAM: XR ANKLE RT G/E 3 VW  LOCATION: [facility]  DATE/TIME: 1/27/2021 8:08 PM    INDICATION: Right ankle pain after an  injury.  COMPARISON: None.      Impression    IMPRESSION:  1.  Nondisplaced spiral fracture of the right tibia distal metadiaphysis and metaphysis. The distal margin of the fracture extends to the level of the talar dome. No additional fractures.  2.  Normal ankle joint spacing and alignment.  3.  Soft tissue swelling in the lateral and anterior ankle.     Medications   ibuprofen (ADVIL/MOTRIN) tablet 600 mg (600 mg Oral Given 1/27/21 2205)        Assessments & Plan (with Medical Decision Making)   Angeline presents with right ankle pain after a fall.  CMS intact.  Xray with a nondisplaced distal fibula fracture.  She was placed in an Airstirrup splint and given crutches for ambulation.  There were no other injuries identifid, specifically no knee injury or pain.  She will follow up with her PCP/ orthopedist in the next week.      I have reviewed the nursing notes. I have reviewed the findings, diagnosis, plan and need for follow up with the patient.    New Prescriptions    No medications on file       Final diagnoses:   None       --  Thiago Burgess MD  [facility] EMERGENCY DEPARTMENT  1/27/2021     Thiago Burgess MD  01/29/21 1248

## 2021-01-29 ENCOUNTER — TELEPHONE (OUTPATIENT)
Dept: ORTHOPEDICS | Facility: CLINIC | Age: 41
End: 2021-01-29

## 2021-01-29 ENCOUNTER — PREP FOR PROCEDURE (OUTPATIENT)
Dept: ORTHOPEDICS | Facility: CLINIC | Age: 41
End: 2021-01-29

## 2021-01-29 DIAGNOSIS — Z20.822 CLOSE EXPOSURE TO 2019 NOVEL CORONAVIRUS: ICD-10-CM

## 2021-01-29 DIAGNOSIS — S82.891A CLOSED FRACTURE OF RIGHT ANKLE, INITIAL ENCOUNTER: ICD-10-CM

## 2021-01-29 DIAGNOSIS — J30.2 SEASONAL ALLERGIES: ICD-10-CM

## 2021-01-29 DIAGNOSIS — Z53.9 ERRONEOUS ENCOUNTER--DISREGARD: Primary | ICD-10-CM

## 2021-01-29 DIAGNOSIS — S82.891A CLOSED FRACTURE OF RIGHT ANKLE, INITIAL ENCOUNTER: Primary | ICD-10-CM

## 2021-01-29 LAB
SARS-COV-2 RNA RESP QL NAA+PROBE: NORMAL
SPECIMEN SOURCE: NORMAL

## 2021-01-29 PROCEDURE — U0005 INFEC AGEN DETEC AMPLI PROBE: HCPCS | Mod: 90 | Performed by: PATHOLOGY

## 2021-01-29 PROCEDURE — U0003 INFECTIOUS AGENT DETECTION BY NUCLEIC ACID (DNA OR RNA); SEVERE ACUTE RESPIRATORY SYNDROME CORONAVIRUS 2 (SARS-COV-2) (CORONAVIRUS DISEASE [COVID-19]), AMPLIFIED PROBE TECHNIQUE, MAKING USE OF HIGH THROUGHPUT TECHNOLOGIES AS DESCRIBED BY CMS-2020-01-R: HCPCS | Mod: 90 | Performed by: PATHOLOGY

## 2021-01-29 PROCEDURE — 99000 SPECIMEN HANDLING OFFICE-LAB: CPT | Performed by: PATHOLOGY

## 2021-01-29 NOTE — TELEPHONE ENCOUNTER
[facility] Call Center    Phone Message    May a detailed message be left on voicemail: yes     Reason for Call: Other: patient would like a call back to clarify surgery scheduling and pre-op and post-op preparations.     Action Taken: Message routed to:  [facility] ortho    Travel Screening: Not Applicable

## 2021-01-29 NOTE — TELEPHONE ENCOUNTER
Patient is scheduled for surgery with Dr. Hoover    Spoke or left message with: Patient    Date of Surgery: 2/2/21    Location: ASC    Post ops: 2 weeks & 6 weeks    Pre-op with surgeon (if applicable): Complete    H&P: Scheduled with Dr Hassan, 2/21/21    Additional imaging/appointments: N/A    Surgery packet: N/A     Additional comments[place] [facility] [place]

## 2021-01-29 NOTE — TELEPHONE ENCOUNTER
Dr Hoover phoned RN about plan for Angeline to be scheduled for right ankle fracture ORIF next Tuesday 2/2/21 after clinic.  Surgery worksheet has been entered and surgery scheduler will be notified.  Suze Blandon RN    Pt was phoned, we reviewed preop preparations including need for preop H&P, COVID test, NPO, and showers.  She will have someone from clinic bring her the antiseptic soap.  Our PAC clinic does not have openings, pt will be asked to see her PCP.  Pt states she will call and set up the preop H&P.  She has Kim's number and RN's number as needed.  Suze Blandon RN

## 2021-01-30 LAB
LABORATORY COMMENT REPORT: NORMAL
SARS-COV-2 RNA RESP QL NAA+PROBE: NEGATIVE
SPECIMEN SOURCE: NORMAL

## 2021-02-01 ENCOUNTER — ANESTHESIA EVENT (OUTPATIENT)
Dept: SURGERY | Facility: AMBULATORY SURGERY CENTER | Age: 41
End: 2021-02-01

## 2021-02-01 ENCOUNTER — OFFICE VISIT (OUTPATIENT)
Dept: FAMILY MEDICINE | Facility: CLINIC | Age: 41
End: 2021-02-01
Payer: COMMERCIAL

## 2021-02-01 VITALS
SYSTOLIC BLOOD PRESSURE: 104 MMHG | HEIGHT: 62 IN | DIASTOLIC BLOOD PRESSURE: 80 MMHG | HEART RATE: 94 BPM | OXYGEN SATURATION: 99 % | BODY MASS INDEX: 33.86 KG/M2 | TEMPERATURE: 98 F | RESPIRATION RATE: 16 BRPM | WEIGHT: 184 LBS

## 2021-02-01 DIAGNOSIS — Z01.818 PREOP GENERAL PHYSICAL EXAM: Primary | ICD-10-CM

## 2021-02-01 DIAGNOSIS — S82.831D CLOSED FRACTURE OF DISTAL END OF RIGHT FIBULA WITH ROUTINE HEALING, UNSPECIFIED FRACTURE MORPHOLOGY, SUBSEQUENT ENCOUNTER: ICD-10-CM

## 2021-02-01 LAB — HGB BLD-MCNC: 12.1 G/DL (ref 11.7–15.7)

## 2021-02-01 PROCEDURE — 99214 OFFICE O/P EST MOD 30 MIN: CPT | Performed by: FAMILY MEDICINE

## 2021-02-01 PROCEDURE — 36415 COLL VENOUS BLD VENIPUNCTURE: CPT | Performed by: FAMILY MEDICINE

## 2021-02-01 PROCEDURE — 85018 HEMOGLOBIN: CPT | Performed by: FAMILY MEDICINE

## 2021-02-01 RX ORDER — NALOXONE HYDROCHLORIDE 0.4 MG/ML
0.4 INJECTION, SOLUTION INTRAMUSCULAR; INTRAVENOUS; SUBCUTANEOUS
Status: DISCONTINUED | OUTPATIENT
Start: 2021-02-01 | End: 2021-02-03 | Stop reason: HOSPADM

## 2021-02-01 RX ORDER — NALOXONE HYDROCHLORIDE 0.4 MG/ML
0.2 INJECTION, SOLUTION INTRAMUSCULAR; INTRAVENOUS; SUBCUTANEOUS
Status: DISCONTINUED | OUTPATIENT
Start: 2021-02-01 | End: 2021-02-03 | Stop reason: HOSPADM

## 2021-02-01 ASSESSMENT — MIFFLIN-ST. JEOR: SCORE: 1449.93

## 2021-02-01 NOTE — PROGRESS NOTES
[facility] [address] [place] [address]  Phone: [phone]  Primary Provider: Tri Wagner  Pre-op Performing Provider: ESTELA PONCE    PREOPERATIVE EVALUATION:  Today's date: 2/1/2021    Angeline Aceves is a 40 year old female who presents for a preoperative evaluation.    Surgical Information:  Surgery/Procedure: Open reduction internal fixation Right ankle  [place] [facility] [place]  Surgeon: Simon Hoover MD  Surgery Date: 02/02/2021  Time of Surgery: 2:45pm  Where patient plans to recover: At home with family  Fax number for surgical facility: Note does not need to be faxed, will be available electronically in Epic.    Type of Anesthesia Anticipated: Choice    Subjective     HPI related to upcoming procedure: right ankle surgery.   Was walking home and slipped on ice. Landed on inverted ankle.   Fibula fracture. In the ER, was told to have both tibia an fibula fracture.   No concern of pregnancy.     Preop Questions 1/29/2021   1. Have you ever had a heart attack or stroke? No   2. Have you ever had surgery on your heart or blood vessels, such as a stent placement, a coronary artery bypass, or surgery on an artery in your head, neck, heart, or legs? No   3. Do you have chest pain with activity? No   4. Do you have a history of  heart failure? No   5. Do you currently have a cold, bronchitis or symptoms of other infection? No   6. Do you have a cough, shortness of breath, or wheezing? No   7. Do you or anyone in your family have previous history of blood clots? No   8. Do you or does anyone in your family have a serious bleeding problem such as prolonged bleeding following surgeries or cuts? No   9. Have you ever had problems with anemia or been told to take iron pills? No   10. Have you had any abnormal blood loss such as black, tarry or bloody stools, or abnormal vaginal bleeding? No   11. Have you ever had a blood transfusion? No    12. Are you willing to have a blood transfusion if it is medically needed before, during, or after your surgery? Yes   13. Have you or any of your relatives ever had problems with anesthesia? No   14. Do you have sleep apnea, excessive snoring or daytime drowsiness? No   15. Do you have any artifical heart valves or other implanted medical devices like a pacemaker, defibrillator, or continuous glucose monitor? No   16. Do you have artificial joints? No   17. Are you allergic to latex? No   18. Is there any chance that you may be pregnant? No     Health Care Directive:  Patient does not have a Health Care Directive or Living Will: Discussed advance care planning with patient; information given to patient to review.    Preoperative Review of :   reviewed - no record of controlled substances prescribed.          Review of Systems  Constitutional, neuro, ENT, endocrine, pulmonary, cardiac, gastrointestinal, genitourinary, musculoskeletal, integument and psychiatric systems are negative, except as otherwise noted.    Patient Active Problem List    Diagnosis Date Noted     Closed fracture of right ankle, initial encounter 01/29/2021     Priority: Medium     Added automatically from request for surgery [id number]       Chronic seasonal allergic rhinitis, unspecified trigger 07/11/2017     Priority: Medium     Other acne 10/21/2016     Priority: Medium     Irritable bowel syndrome 09/25/2015     Priority: Medium     Headache 06/20/2014     Priority: Medium     Problem list name updated by automated process. Provider to review       Anemia 06/20/2014     Priority: Medium     Large breasts 01/18/2013     Priority: Medium     CARDIOVASCULAR SCREENING; LDL GOAL LESS THAN 160 10/31/2010     Priority: Medium     Unilateral deafness 02/12/2009     Priority: Medium     Left ear  (Problem list name updated by automated process. Provider to review and confirm.)        Past Medical History:   Diagnosis Date     Headache 6/20/2014  "    Headache      Headache      Irritable bowel syndrome 9/25/2015     Unilateral deafness 2/12/2009    Left ear (Problem list name updated by automated process. Provider to review and confirm.)     Past Surgical History:   Procedure Laterality Date     C ANESTH,CLEFT PALATE REPAIR      10 surgeries 2 weeks-16 years of age     ENT SURGERY      Tubes as a child, exploratory on right in '97     Current Outpatient Medications   Medication Sig Dispense Refill     ibuprofen (ADVIL/MOTRIN) 600 MG tablet Take 1 tablet (600 mg) by mouth every 6 hours as needed for moderate pain 24 tablet 0       No Known Allergies     Social History     Tobacco Use     Smoking status: Never Smoker     Smokeless tobacco: Never Used   Substance Use Topics     Alcohol use: Yes     Alcohol/week: 1.7 - 2.5 standard drinks     Comment: 3-4 drinks per week     Family History   Problem Relation Age of Onset     Diabetes Maternal Grandfather      Cancer - colorectal Paternal Grandmother      Cerebrovascular Disease Paternal Grandmother      Colon Cancer Paternal Grandmother      History   Drug Use No         Objective     /80 (BP Location: Left arm, Patient Position: Sitting, Cuff Size: Adult Regular)   Pulse 94   Temp 98  F (36.7  C) (Oral)   Resp 16   Ht 1.562 m (5' 1.5\")   Wt 83.5 kg (184 lb)   LMP 01/17/2021   SpO2 99%   BMI 34.20 kg/m      Physical Exam    GENERAL APPEARANCE: healthy, alert and no distress     EYES: EOMI, PERRL     HENT: ear canals and TM's normal and nose and mouth without ulcers or lesions, tympanostomy scars      NECK: no adenopathy, no asymmetry, masses, or scars and thyroid normal to palpation     RESP: lungs clear to auscultation - no rales, rhonchi or wheezes     CV: regular rates and rhythm, normal S1 S2, no S3 or S4 and no murmur, click or rub     MS: walking with crutches, right foot in cam boot.      SKIN: no suspicious lesions or rashes on visible parts.      NEURO: Normal strength and tone, sensory " exam grossly normal, mentation intact and speech normal     PSYCH: mentation appears normal. and affect normal/bright    Recent Labs   Lab Test 10/02/19  1417   HGB 12.2      Diagnostics:  Hgb pending.   No EKG required for low risk surgery (cataract, skin procedure, breast biopsy, etc).    Revised Cardiac Risk Index (RCRI):  The patient has the following serious cardiovascular risks for perioperative complications:   - No serious cardiac risks = 0 points     RCRI Interpretation: 0 points: Class I (very low risk - 0.4% complication rate)            Assessment & Plan   The proposed surgical procedure is considered INTERMEDIATE risk.    Preop general physical exam  Still using ibuprofen. Not aware of stopping it. Will not use it tonight. Will have surgery tomorrow. No other bleeding risk.   - Hemoglobin    Closed fracture of distal end of right fibula with routine healing, unspecified fracture morphology, subsequent encounter  See above.          Risks and Recommendations:  The patient has the following additional risks and recommendations for perioperative complications:   - No identified additional risk factors other than previously addressed    Medication Instructions:  see above regarding nsaids. No other chronic medications.     RECOMMENDATION:  APPROVAL GIVEN to proceed with proposed procedure, without further diagnostic evaluation.    Signed Electronically by: Jb Hassan MD, MD    Copy of this evaluation report is provided to requesting physician.    Preop [facility] Preop Guidelines    Revised Cardiac Risk Index

## 2021-02-01 NOTE — PATIENT INSTRUCTIONS

## 2021-02-02 ENCOUNTER — ANESTHESIA (OUTPATIENT)
Dept: SURGERY | Facility: AMBULATORY SURGERY CENTER | Age: 41
End: 2021-02-02

## 2021-02-02 ENCOUNTER — HOSPITAL ENCOUNTER (OUTPATIENT)
Facility: AMBULATORY SURGERY CENTER | Age: 41
Discharge: HOME | End: 2021-02-02
Attending: ORTHOPAEDIC SURGERY | Admitting: ORTHOPAEDIC SURGERY
Payer: COMMERCIAL

## 2021-02-02 ENCOUNTER — ANCILLARY PROCEDURE (OUTPATIENT)
Dept: RADIOLOGY | Facility: AMBULATORY SURGERY CENTER | Age: 41
End: 2021-02-02
Attending: ORTHOPAEDIC SURGERY
Payer: COMMERCIAL

## 2021-02-02 VITALS
SYSTOLIC BLOOD PRESSURE: 92 MMHG | OXYGEN SATURATION: 98 % | HEIGHT: 62 IN | RESPIRATION RATE: 12 BRPM | DIASTOLIC BLOOD PRESSURE: 69 MMHG | TEMPERATURE: 98 F | WEIGHT: 175 LBS | BODY MASS INDEX: 32.2 KG/M2 | HEART RATE: 82 BPM

## 2021-02-02 DIAGNOSIS — R52 PAIN: ICD-10-CM

## 2021-02-02 DIAGNOSIS — S82.891A CLOSED FRACTURE OF RIGHT ANKLE, INITIAL ENCOUNTER: ICD-10-CM

## 2021-02-02 LAB
HCG UR QL: NEGATIVE
INTERNAL QC OK POCT: YES

## 2021-02-02 PROCEDURE — 27792 TREATMENT OF ANKLE FRACTURE: CPT | Mod: LT

## 2021-02-02 PROCEDURE — 81025 URINE PREGNANCY TEST: CPT | Performed by: PATHOLOGY

## 2021-02-02 DEVICE — IMPLANTABLE DEVICE: Type: IMPLANTABLE DEVICE | Site: ANKLE | Status: FUNCTIONAL

## 2021-02-02 DEVICE — IMP PLATE ZIM 1/3 TUBULAR 3.5X73MM 06H 00-4935-006-03
Type: IMPLANTABLE DEVICE | Site: ANKLE | Status: NON-FUNCTIONAL
Removed: 2021-10-13

## 2021-02-02 DEVICE — IMP SCR ZIM CORT 3.5X14MM SELF TAP SM HEX SS: Type: IMPLANTABLE DEVICE | Site: ANKLE | Status: FUNCTIONAL

## 2021-02-02 DEVICE — IMP SCR ZIM CANC HEX 4.0X18MM FT: Type: IMPLANTABLE DEVICE | Site: ANKLE | Status: FUNCTIONAL

## 2021-02-02 RX ORDER — LIDOCAINE 40 MG/G
CREAM TOPICAL
Status: DISCONTINUED | OUTPATIENT
Start: 2021-02-02 | End: 2021-02-02 | Stop reason: HOSPADM

## 2021-02-02 RX ORDER — BUPIVACAINE HYDROCHLORIDE 2.5 MG/ML
INJECTION, SOLUTION EPIDURAL; INFILTRATION; INTRACAUDAL PRN
Status: DISCONTINUED | OUTPATIENT
Start: 2021-02-02 | End: 2021-02-02

## 2021-02-02 RX ORDER — HYDROXYZINE PAMOATE 25 MG/1
25 CAPSULE ORAL 3 TIMES DAILY PRN
Qty: 20 CAPSULE | Refills: 0 | Status: SHIPPED | OUTPATIENT
Start: 2021-02-02 | End: 2021-02-16

## 2021-02-02 RX ORDER — FENTANYL CITRATE 50 UG/ML
25-50 INJECTION, SOLUTION INTRAMUSCULAR; INTRAVENOUS
Status: DISCONTINUED | OUTPATIENT
Start: 2021-02-02 | End: 2021-02-02 | Stop reason: HOSPADM

## 2021-02-02 RX ORDER — AMOXICILLIN 250 MG
1-2 CAPSULE ORAL 2 TIMES DAILY
Qty: 30 TABLET | Refills: 0 | Status: SHIPPED | OUTPATIENT
Start: 2021-02-02 | End: 2021-02-16

## 2021-02-02 RX ORDER — GABAPENTIN 300 MG/1
300 CAPSULE ORAL ONCE
Status: COMPLETED | OUTPATIENT
Start: 2021-02-02 | End: 2021-02-02

## 2021-02-02 RX ORDER — CEFAZOLIN SODIUM 1 G/3ML
INJECTION, POWDER, FOR SOLUTION INTRAMUSCULAR; INTRAVENOUS PRN
Status: DISCONTINUED | OUTPATIENT
Start: 2021-02-02 | End: 2021-02-02

## 2021-02-02 RX ORDER — OXYCODONE HYDROCHLORIDE 5 MG/1
5 TABLET ORAL EVERY 4 HOURS PRN
Status: DISCONTINUED | OUTPATIENT
Start: 2021-02-02 | End: 2021-02-03 | Stop reason: HOSPADM

## 2021-02-02 RX ORDER — ONDANSETRON 2 MG/ML
INJECTION INTRAMUSCULAR; INTRAVENOUS PRN
Status: DISCONTINUED | OUTPATIENT
Start: 2021-02-02 | End: 2021-02-02

## 2021-02-02 RX ORDER — HYDROMORPHONE HYDROCHLORIDE 1 MG/ML
.3-.5 INJECTION, SOLUTION INTRAMUSCULAR; INTRAVENOUS; SUBCUTANEOUS EVERY 10 MIN PRN
Status: DISCONTINUED | OUTPATIENT
Start: 2021-02-02 | End: 2021-02-03 | Stop reason: HOSPADM

## 2021-02-02 RX ORDER — FLUMAZENIL 0.1 MG/ML
0.2 INJECTION, SOLUTION INTRAVENOUS
Status: DISCONTINUED | OUTPATIENT
Start: 2021-02-02 | End: 2021-02-02 | Stop reason: HOSPADM

## 2021-02-02 RX ORDER — ONDANSETRON 4 MG/1
4 TABLET, ORALLY DISINTEGRATING ORAL EVERY 30 MIN PRN
Status: DISCONTINUED | OUTPATIENT
Start: 2021-02-02 | End: 2021-02-03 | Stop reason: HOSPADM

## 2021-02-02 RX ORDER — GLYCOPYRROLATE 0.2 MG/ML
INJECTION, SOLUTION INTRAMUSCULAR; INTRAVENOUS PRN
Status: DISCONTINUED | OUTPATIENT
Start: 2021-02-02 | End: 2021-02-02

## 2021-02-02 RX ORDER — NALOXONE HYDROCHLORIDE 0.4 MG/ML
0.4 INJECTION, SOLUTION INTRAMUSCULAR; INTRAVENOUS; SUBCUTANEOUS
Status: DISCONTINUED | OUTPATIENT
Start: 2021-02-02 | End: 2021-02-03 | Stop reason: HOSPADM

## 2021-02-02 RX ORDER — PROPOFOL 10 MG/ML
INJECTION, EMULSION INTRAVENOUS CONTINUOUS PRN
Status: DISCONTINUED | OUTPATIENT
Start: 2021-02-02 | End: 2021-02-02

## 2021-02-02 RX ORDER — SODIUM CHLORIDE, SODIUM LACTATE, POTASSIUM CHLORIDE, CALCIUM CHLORIDE 600; 310; 30; 20 MG/100ML; MG/100ML; MG/100ML; MG/100ML
INJECTION, SOLUTION INTRAVENOUS CONTINUOUS
Status: DISCONTINUED | OUTPATIENT
Start: 2021-02-02 | End: 2021-02-03 | Stop reason: HOSPADM

## 2021-02-02 RX ORDER — ACETAMINOPHEN 325 MG/1
975 TABLET ORAL ONCE
Status: COMPLETED | OUTPATIENT
Start: 2021-02-02 | End: 2021-02-02

## 2021-02-02 RX ORDER — NALOXONE HYDROCHLORIDE 0.4 MG/ML
0.2 INJECTION, SOLUTION INTRAMUSCULAR; INTRAVENOUS; SUBCUTANEOUS
Status: DISCONTINUED | OUTPATIENT
Start: 2021-02-02 | End: 2021-02-03 | Stop reason: HOSPADM

## 2021-02-02 RX ORDER — DEXAMETHASONE SODIUM PHOSPHATE 4 MG/ML
INJECTION, SOLUTION INTRA-ARTICULAR; INTRALESIONAL; INTRAMUSCULAR; INTRAVENOUS; SOFT TISSUE PRN
Status: DISCONTINUED | OUTPATIENT
Start: 2021-02-02 | End: 2021-02-02

## 2021-02-02 RX ORDER — CEFAZOLIN SODIUM 2 G/50ML
2 SOLUTION INTRAVENOUS
Status: DISCONTINUED | OUTPATIENT
Start: 2021-02-02 | End: 2021-02-02 | Stop reason: HOSPADM

## 2021-02-02 RX ORDER — MEPERIDINE HYDROCHLORIDE 25 MG/ML
12.5 INJECTION INTRAMUSCULAR; INTRAVENOUS; SUBCUTANEOUS
Status: DISCONTINUED | OUTPATIENT
Start: 2021-02-02 | End: 2021-02-03 | Stop reason: HOSPADM

## 2021-02-02 RX ORDER — SODIUM CHLORIDE, SODIUM LACTATE, POTASSIUM CHLORIDE, CALCIUM CHLORIDE 600; 310; 30; 20 MG/100ML; MG/100ML; MG/100ML; MG/100ML
INJECTION, SOLUTION INTRAVENOUS CONTINUOUS
Status: DISCONTINUED | OUTPATIENT
Start: 2021-02-02 | End: 2021-02-02 | Stop reason: HOSPADM

## 2021-02-02 RX ORDER — HYDROCODONE BITARTRATE AND ACETAMINOPHEN 5; 325 MG/1; MG/1
1-2 TABLET ORAL EVERY 4 HOURS PRN
Qty: 20 TABLET | Refills: 0 | Status: SHIPPED | OUTPATIENT
Start: 2021-02-02 | End: 2021-02-16

## 2021-02-02 RX ORDER — ONDANSETRON 2 MG/ML
4 INJECTION INTRAMUSCULAR; INTRAVENOUS EVERY 30 MIN PRN
Status: DISCONTINUED | OUTPATIENT
Start: 2021-02-02 | End: 2021-02-03 | Stop reason: HOSPADM

## 2021-02-02 RX ORDER — CEFAZOLIN SODIUM 1 G/50ML
1 SOLUTION INTRAVENOUS SEE ADMIN INSTRUCTIONS
Status: DISCONTINUED | OUTPATIENT
Start: 2021-02-02 | End: 2021-02-02 | Stop reason: HOSPADM

## 2021-02-02 RX ORDER — LIDOCAINE HYDROCHLORIDE 20 MG/ML
INJECTION, SOLUTION INFILTRATION; PERINEURAL PRN
Status: DISCONTINUED | OUTPATIENT
Start: 2021-02-02 | End: 2021-02-02

## 2021-02-02 RX ORDER — PROPOFOL 10 MG/ML
INJECTION, EMULSION INTRAVENOUS PRN
Status: DISCONTINUED | OUTPATIENT
Start: 2021-02-02 | End: 2021-02-02

## 2021-02-02 RX ADMIN — GLYCOPYRROLATE 0.2 MG: 0.2 INJECTION, SOLUTION INTRAMUSCULAR; INTRAVENOUS at 14:47

## 2021-02-02 RX ADMIN — FENTANYL CITRATE 50 MCG: 50 INJECTION, SOLUTION INTRAMUSCULAR; INTRAVENOUS at 13:42

## 2021-02-02 RX ADMIN — DEXAMETHASONE SODIUM PHOSPHATE 4 MG: 4 INJECTION, SOLUTION INTRA-ARTICULAR; INTRALESIONAL; INTRAMUSCULAR; INTRAVENOUS; SOFT TISSUE at 15:00

## 2021-02-02 RX ADMIN — CEFAZOLIN SODIUM 2 G: 1 INJECTION, POWDER, FOR SOLUTION INTRAMUSCULAR; INTRAVENOUS at 14:54

## 2021-02-02 RX ADMIN — GABAPENTIN 300 MG: 300 CAPSULE ORAL at 13:04

## 2021-02-02 RX ADMIN — PROPOFOL 160 MG: 10 INJECTION, EMULSION INTRAVENOUS at 14:48

## 2021-02-02 RX ADMIN — BUPIVACAINE HYDROCHLORIDE 25 ML: 2.5 INJECTION, SOLUTION EPIDURAL; INFILTRATION; INTRACAUDAL at 13:45

## 2021-02-02 RX ADMIN — DEXAMETHASONE SODIUM PHOSPHATE 2 MG: 4 INJECTION, SOLUTION INTRA-ARTICULAR; INTRALESIONAL; INTRAMUSCULAR; INTRAVENOUS; SOFT TISSUE at 14:47

## 2021-02-02 RX ADMIN — ACETAMINOPHEN 975 MG: 325 TABLET ORAL at 13:04

## 2021-02-02 RX ADMIN — SODIUM CHLORIDE, SODIUM LACTATE, POTASSIUM CHLORIDE, CALCIUM CHLORIDE: 600; 310; 30; 20 INJECTION, SOLUTION INTRAVENOUS at 13:11

## 2021-02-02 RX ADMIN — PROPOFOL 40 MG: 10 INJECTION, EMULSION INTRAVENOUS at 14:54

## 2021-02-02 RX ADMIN — ONDANSETRON 4 MG: 2 INJECTION INTRAMUSCULAR; INTRAVENOUS at 15:09

## 2021-02-02 RX ADMIN — BUPIVACAINE HYDROCHLORIDE 15 ML: 2.5 INJECTION, SOLUTION EPIDURAL; INFILTRATION; INTRACAUDAL at 13:46

## 2021-02-02 RX ADMIN — PROPOFOL 70 MG: 10 INJECTION, EMULSION INTRAVENOUS at 15:06

## 2021-02-02 RX ADMIN — PROPOFOL 50 MCG/KG/MIN: 10 INJECTION, EMULSION INTRAVENOUS at 14:55

## 2021-02-02 RX ADMIN — LIDOCAINE HYDROCHLORIDE 50 MG: 20 INJECTION, SOLUTION INFILTRATION; PERINEURAL at 15:12

## 2021-02-02 ASSESSMENT — MIFFLIN-ST. JEOR: SCORE: 1409.1

## 2021-02-02 NOTE — ANESTHESIA PROCEDURE NOTES
Pre-Procedure   Staff -   Anesthesiologist:  Berny Doss DO  Performed By: anesthesiologist  Location: pre-op  Procedure Start/Stop Times: 2/2/2021 1:35 PM and 2/2/2021 1:40 PM  Pre-Anesthestic Checklist: patient identified, IV checked, site marked, risks and benefits discussed, informed consent, monitors and equipment checked, pre-op evaluation, at physician/surgeon's request and post-op pain management  Timeout:  Correct Patient: Yes   Correct Procedure: Yes   Correct Site: Yes   Correct Position: Yes   Correct Laterality: Yes   Site Marked: Yes    Procedure Documentation  Procedure: Popliteal  Diagnosis: POST OP PAIN  Laterality: right  Patient Position:supine  Patient Prep/Sterile Barriers: sterile gloves, mask, Chloraprep  Local skin infiltrated with mL of 1% lidocaine.   Needle type: short bevel and insulated  Needle Gauge: 21.   Needle Length (millimeters) 110   Ultrasound guided, Ultrasound used to identify targeted nerve, plexus, vascular marker, or fascial plane and place a needle adjacent to it in real-time, Ultrasound was used to visualize the spread of anesthetic in close proximity to the above referenced structure  A permanent image is entered into the patient's record.  The nerve(s) appeared anatomically normal, There were no apparent abnormal pathologic findings    Assessment/Narrative      The placement was negative for: blood aspirated, painful injection and site bleeding  Paresthesias: No.  Bolus given via needle..   Secured via.   Insertion/Infusion Method: Single Shot  Complications: none  Injection made incrementally with aspirations every 5 mL.  Comments:  Informed consent obtained.  All risks and benefits of the nerve block discussed with the patient.  All questions answered and all parties agreed with the plan.   Block was placed at the surgeon's request for post operative pain control.

## 2021-02-02 NOTE — PROGRESS NOTES
Patient received right side Adductor nerve block  without Exparel.  Fentanyl 50mcg. Tolerated procedure well.

## 2021-02-02 NOTE — ANESTHESIA POSTPROCEDURE EVALUATION
Patient: Angeline Aceves    Procedure(s):  Open reduction internal fixation Right ankle    Diagnosis:Closed fracture of right ankle, initial encounter [U05.845B]  Diagnosis Additional Information: No value filed.    Anesthesia Type:  General    Note:  Disposition: Outpatient   Postop Pain Control: Uneventful            Sign Out: Well controlled pain   PONV: No   Neuro/Psych: Uneventful            Sign Out: Acceptable/Baseline neuro status   Airway/Respiratory: Uneventful            Sign Out: Acceptable/Baseline resp. status   CV/Hemodynamics: Uneventful            Sign Out: Acceptable CV status   Other NRE: NONE   DID A NON-ROUTINE EVENT OCCUR? No         Last vitals:  Vitals:    02/02/21 1526 02/02/21 1530 02/02/21 1545   BP: (!) 82/45 90/64 92/69   Pulse: 89 87 82   Resp: 16 13 12   Temp: 35.7  C (96.3  F) 36.7  C (98  F) 36.7  C (98  F)   SpO2: 95% 95% 98%       Electronically Signed By: Berny Doss DO  February 2, 2021  3:54 PM

## 2021-02-02 NOTE — DISCHARGE INSTRUCTIONS
Same Day surgery discharge to home once criteria met.  CAM boot to remain on right  lower extremity and NWB at all times.  Oxycodone and Vistaril for pain.  No dressing change on own.  Leave dressing on until 2 weeks follow up appointment.  F/U in clinic in 2 weeks  [facility]  Home Care Following Anesthesia  For 24 hours after surgery:  1. Get plenty of rest.  A responsible adult must stay with you for at least 24 hours after you leave the surgery center.  2. Do not drive or use heavy equipment.  If you have weakness or tingling, don't drive or use heavy equipment until this feeling goes away.   3. Do not drink alcohol.   4. Avoid strenuous or risky activities.  Ask for help when climbing stairs.  5. You may feel lightheaded.  IF so, sit for a few minutes before standing.  Have someone help you get up.   6. If you have nausea (feel sick to your stomach): Drink only clear liquids such as apple juice, ginger ale, broth or 7-Up.  Rest may also help.  Be sure to drink enough fluids.  Move to a regular diet as you feel able.   7. You may have a slight fever.  Call the doctor if your fever is over 100 F (37.7 C) (taken under the tongue) or lasts longer than 24 hours.  8. You may have a dry mouth, a sore throat, muscle aches or trouble sleeping. These should go away after 24 hours.  9. Do not make important or legal decisions.               Tips for taking pain medications  To get the best pain relief possible, remember these points:    Take pain medications as directed, before pain becomes severe.    Pain medication can upset your stomach: taking it with food may help.    Constipation is a common side effect of pain medication. Drink plenty of  fluids.    Eat foods high in fiber. Take a stool softener if recommended by your doctor or pharmacist.    Do not drink alcohol, drive or operate machinery while taking pain medications.    Ask about other ways to control pain, such as with  heat, ice or relaxation.    Tylenol/Acetaminophen Consumption  To help encourage the safe use of acetaminophen, the makers of TYLENOL  have lowered the maximum daily dose for single-ingredient Extra Strength TYLENOL  (acetaminophen) products sold in the [place] from 8 pills per day (4,000 mg) to 6 pills per day (3,000 mg). The dosing interval has also changed from 2 pills every 4-6 hours to 2 pills every 6 hours.    If you feel your pain relief is insufficient, you may take Tylenol/Acetaminophen in addition to your narcotic pain medication.     Be careful not to exceed 3,000 mg of Tylenol/Acetaminophen in a 24 hour period from all sources.    If you are taking extra strength Tylenol/acetaminophen (500 mg), the maximum dose is 6 tablets in 24 hours.    If you are taking regular strength acetaminophen (325 mg), the maximum dose is 9 tablets in 24 hours.    Call a doctor for any of the followin. Signs of infection (fever, growing tenderness at the surgery site, a large amount of drainage or bleeding, severe pain, foul-smelling drainage, redness, swelling).  2. It has been over 8 to 10 hours since surgery and you are still not able to urinate (pass water).  3. Headache for over 24 hours.  4. Numbness, tingling or weakness the day after surgery (if you had spinal anesthesia).  5. Signs of Covid-19 infection (temperature over 100 degrees, shortness of breath, cough, loss of taste/smell, generalized body aches, persistent headache, chills, sore throat, nausea/vomiting/diarrhea)  Your doctor is:       Dr. Simon Hoover, Orthopaedics: [phone]               Or dial [phone] and ask for the resident on call for:  Orthopaedics  For emergency care, call the:  [facility]:  [phone] (TTY for hearing impaired: [phone])    Information about liposomal bupivacaine (Exparel)    What is Liposomal Bupivacaine?    Liposomal Bupivacaine is a numbing medication that can help you manage your pain after  "surgery.  This medication is similar to \"novacaine,\" which is often used by the dentist.  Liposomal bupivacaine is released slowly and can help control pain for up to 72 hours.    What is the purpose of Liposomal Bupivacaine?    To manage your pain after surgery    To help you sleep better, take deep breaths, walk more comfortable, and feel up to visiting with others    How is the procedure done?    Liposomal bupivacaine is a medication given by an injection.    It is usually given right before your surgery.  If this is the case, you will be awake or sedated, but you should experience minimal pain during the procedure.    For some people, the injection may be given at the very end of your surgery.  It all depends on the type of surgery and your situation.    The procedure usually takes about 5-15 minutes.  An ultrasound machine will help the anesthesiologist insert it in the right place or the surgeon will inject it under direct vision.     A needle is used to place the numbing medication under your skin.  It provides pain relief by numbing the tissue in the area where your surgeon will make the incision.    What can I expect?    You may experience numbness, tingling, or a feeling of heaviness around the area that was injected.    If you experience any of the follow symptoms IMMEDIATELY CALL THE REGIONAL ANESTHESIA PAIN SERVICE:    Numbness or tingling occurs in areas other than around the injection site    Blurry vision    Ringing in your ears    A metallic taste in your mouth    PAGE: Dial [phone].  When prompted, enter the following 4-digit ID number:  [id number].  You will be prompted to enter your phone number; and then enter the # sign.  The clinician on call will call you back.    OR    CALL: Dial [phone].  Let the hospital  know that you are having a problem with a nerve block and that you would like to speak to the regional anesthesia pain service right away.    You should not receive any other " type of numbing medication within 4 days after receiving liposomal bupivacaine unless your anesthesiologist approves.    Post Operative Instructions: Regional Anesthetic for Lower Extremity with Liposomal Bupivacaine  General Information:   Regional anesthesia is when local anesthetic or  numbing  medication is injected around the nerves to anesthetize or  numb  the area supplied by that set of nerves. It is a type of analgesia used to control pain and decreases the need for narcotics following surgery.    Types of Regional Blocks:  Femoral: A block injected into the groin area of the operative leg of a patient having thigh or knee surgery.  Adductor Canal: A block injected into the mid thigh of the operative leg of a patient having knee or ankle surgery.  Popliteal or Distal Sciatic: A block injected into the back of the knee of the operative leg of patients having foot or ankle surgery.   Ankle:  An anesthetic medication is injected into the ankle of the operative leg of a patient having foot or toe surgery.     Procedure:   The type of anesthesia your doctor used to numb your leg will usually not wear off for 24-48 hours, but may last as long as 72 hours. You should be careful during that period, since it is possible to injure your leg without being aware of the injury. While your leg is numb you should:    Use crutches (minimal weight bearing until your motor and strength is completely back to normal)    Avoid striking or bumping your leg    Avoid extreme hot or cold    Discomfort:  You will have a tingling and prickly sensation in your leg as the feeling begins to return; you can also expect some discomfort. The amount of discomfort is unpredictable, but if you have more pain than can be controlled with pain medication, you should notify your physician.     Pain Medicine:   Begin taking your oral pain pills before bedtime and during the night to avoid a sudden onset of pain as part of the block wears off. Do  not engage in drinking, driving, or hazardous occupations while taking pain medication.

## 2021-02-02 NOTE — ANESTHESIA PROCEDURE NOTES
Pre-Procedure   Staff -   Anesthesiologist:  Berny Doss DO  Performed By: anesthesiologist  Location: pre-op  Procedure Start/Stop Times: 2/2/2021 1:41 PM and 2/2/2021 1:46 PM  Pre-Anesthestic Checklist: patient identified, IV checked, site marked, risks and benefits discussed, informed consent, monitors and equipment checked, pre-op evaluation, at physician/surgeon's request and post-op pain management  Timeout:  Correct Patient: Yes   Correct Procedure: Yes   Correct Site: Yes   Correct Position: Yes   Correct Laterality: Yes   Site Marked: Yes    Procedure Documentation  Procedure: Adductor canal  Diagnosis: POST OP PAIN  Laterality: right  Patient Position:supine  Patient Prep/Sterile Barriers: sterile gloves, mask, Chloraprep  Needle type: insulated and short bevel  Needle Gauge: 21.   Needle Length (millimeters) 110   Ultrasound guided, Ultrasound used to identify targeted nerve, plexus, vascular marker, or fascial plane and place a needle adjacent to it in real-time, Ultrasound was used to visualize the spread of anesthetic in close proximity to the above referenced structure  A permanent image is entered into the patient's record.  The nerve(s) appeared anatomically normal, There were no apparent abnormal pathologic findings    Assessment/Narrative      The placement was negative for: blood aspirated, painful injection and site bleeding  Paresthesias: No.  Bolus given via needle..   Secured via.   Insertion/Infusion Method: Single Shot  Complications: none  Injection made incrementally with aspirations every 5 mL.  Comments:  Informed consent obtained.  All risks and benefits of the nerve block discussed with the patient.  All questions answered and all parties agreed with the plan.   Block was placed at the surgeon's request for post operative pain control.

## 2021-02-02 NOTE — ANESTHESIA PREPROCEDURE EVALUATION
Anesthesia Pre-Procedure Evaluation    Patient: Angeline Aceves   MRN: [id number] : 1980        Preoperative Diagnosis: Closed fracture of right ankle, initial encounter [S82.466M]   Procedure : Procedure(s):  Open reduction internal fixation Right ankle     Past Medical History:   Diagnosis Date     Headache 2014     Headache      Headache      Irritable bowel syndrome 2015     Unilateral deafness 2009    Left ear (Problem list name updated by automated process. Provider to review and confirm.)      Past Surgical History:   Procedure Laterality Date     C ANESTH,CLEFT PALATE REPAIR      10 surgeries 2 weeks-16 years of age     ENT SURGERY      Tubes as a child, exploratory on right in '97      No Known Allergies   Social History     Tobacco Use     Smoking status: Never Smoker     Smokeless tobacco: Never Used   Substance Use Topics     Alcohol use: Yes     Alcohol/week: 1.7 - 2.5 standard drinks     Comment: 3-4 drinks per week      Wt Readings from Last 1 Encounters:   21 83.5 kg (184 lb)        Anesthesia Evaluation   Pt has not had prior anesthetic         ROS/MED HX  ENT/Pulmonary: Comment: Unilateral deafness      Neurologic:  - neg neurologic ROS     Cardiovascular:  - neg cardiovascular ROS     METS/Exercise Tolerance:     Hematologic:  - neg hematologic  ROS     Musculoskeletal:  - neg musculoskeletal ROS     GI/Hepatic:  - neg GI/hepatic ROS     Renal/Genitourinary:  - neg Renal ROS     Endo:  - neg endo ROS     Psychiatric/Substance Use:  - neg psychiatric ROS     Infectious Disease:  - neg infectious disease ROS     Malignancy:  - neg malignancy ROS     Other:  - neg other ROS          Physical Exam    Airway  airway exam normal      Mallampati: I   TM distance: > 3 FB   Neck ROM: full   Mouth opening: > 3 cm    Respiratory Devices and Support         Dental  no notable dental history         Cardiovascular   cardiovascular exam normal       Rhythm and rate: regular and  normal     Pulmonary   pulmonary exam normal        breath sounds clear to auscultation           OUTSIDE LABS:  CBC:   Lab Results   Component Value Date    HGB 12.1 02/01/2021    HGB 12.2 10/02/2019     BMP:   Lab Results   Component Value Date    GLC 84 10/02/2019    GLC 83 07/11/2017     COAGS: No results found for: PTT, INR, FIBR  POC: No results found for: BGM, HCG, HCGS  HEPATIC: No results found for: ALBUMIN, PROTTOTAL, ALT, AST, GGT, ALKPHOS, BILITOTAL, BILIDIRECT, DENICE  OTHER: No results found for: PH, LACT, A1C, PALMIRA, PHOS, MAG, LIPASE, AMYLASE, TSH, T4, T3, CRP, SED    Anesthesia Plan     History & Physical Review      ASA Status:  2.   Plan for General with Intravenous and Propofol induction. Device: LMA         PONV prophylaxis:  Ondansetron (or other 5HT-3).       Consents  Anesthesia Plan(s) and associated risks, benefits, and realistic alternatives discussed.    Questions answered and patient/representative(s) expressed understanding.    Discussed with:  Patient.           Use of blood products discussed: No.          Postoperative Care  Postoperative pain management: Peripheral nerve block (Single Shot) and Multi-modal analgesia.           Berny Doss, DO

## 2021-02-03 NOTE — OP NOTE
Procedure Date: 02/02/2021      PREOPERATIVE DIAGNOSIS:  Right ankle lateral malleolus fracture.      POSTOPERATIVE DIAGNOSIS:  Right ankle lateral malleolus fracture.      PROCEDURES:   1.  Right ankle lateral malleolus open reduction and internal fixation.   2.  Right ankle syndesmosis exam under anesthesia.      SURGEON:  Simon Hoover MD      ASSISTANT:  None.      COMPLICATIONS:  None.      DRAINS:  None.      ESTIMATED BLOOD LOSS:  Less than 20 mL.      ANESTHESIA:  General endotracheal.      INDICATIONS:  Please refer to clinic notes for further details regarding the indications for Ms. Aceves's case.       PROCEDURE:  On 02/02/2021, the patient was taken to surgery.  Preoperative antibiotics were administered to the patient prior to arrival to the OR.      After successful induction of general endotracheal anesthesia, she was placed supine on the operating table.  The right lower extremity was prepped and draped in a sterile fashion.  After exsanguination by gravity, the tourniquet cuff was inflated to 300 mmHg on the proximal third of the right thigh.      The pause for the cause was performed according to our institution's policy, which confirmed laterality of the procedure.       An incision was made along the lateral aspect of the ankle joint.  Subcutaneous tissues were dissected.  The lateral malleolus fracture was identified, and the fracture hematoma was resected.  Anatomic reduction was accomplished, and we proceeded with placement of a lag screw from anterior and proximal to distal and posterior, which was followed by application of one-third semitubular plate with purchase of 4 cortices proximal to the fracture site and 2 cortices distal to it.      Following this, we proceeded with an exam under anesthesia for ankle syndesmosis by placing a bone clamp proximal to the fracture site and applying posterolateral translation to the lateral malleolus.  This confirmed to have a stable syndesmosis  without any widening of the medial gutter.  This was followed by a forced external rotation test, which confirmed similar findings.      We proceeded then with 3 views of the right ankle to confirm excellent reduction of the fracture fragments as well as location of the hardware.  These images were sent to PACS for definitive documentation.      The tourniquet cuff was deflated.  Satisfactory hemostasis was accomplished.  The wound was closed in layers.  Sterile dressings were applied.  The patient was placed in a toe CAM Walker and transferred in stable condition to PACU.      PLAN:  The patient will remain weightbearing as tolerated with the use of the CAM Walker.  The CAM Walker will be utilized at all times except for hygiene for the first 2 weeks from surgery.  At the 2 week appointment, sutures will be removed if indicated.  She will proceed with physical therapy without restrictions and the use of the CAM Walker for all times except for hygiene, resting, sitting and sleeping activities.      The patient will return to clinic at 6 weeks from surgery, and at that time 3 views of the right ankle will be obtained, and based on those findings, further recommendations will be given to the patient.         TATIANA HU MD             D: 2021   T: 2021   MT: liu      Name:     LUCY MASON   MRN:      [id number]        Account:        [id number]   :      1980           Procedure Date: 2021      Document: [id number]

## 2021-02-09 ENCOUNTER — DOCUMENTATION ONLY (OUTPATIENT)
Dept: ORTHOPEDICS | Facility: CLINIC | Age: 41
End: 2021-02-09

## 2021-02-09 NOTE — PROGRESS NOTES
Angeline underwent right ankle fracture ORIF on 2/2/21.  [facility] paperwork was received, completed, and faxed to [phone] and copy mailed to pt's home.  The document has also been scanned into Epic.  Suze Blandon RN

## 2021-02-15 ENCOUNTER — THERAPY VISIT (OUTPATIENT)
Dept: PHYSICAL THERAPY | Facility: CLINIC | Age: 41
End: 2021-02-15
Payer: COMMERCIAL

## 2021-02-15 DIAGNOSIS — R26.9 ABNORMAL GAIT: ICD-10-CM

## 2021-02-15 DIAGNOSIS — M25.571 ANKLE PAIN, RIGHT: Primary | ICD-10-CM

## 2021-02-15 DIAGNOSIS — S82.891A CLOSED FRACTURE OF RIGHT ANKLE, INITIAL ENCOUNTER: ICD-10-CM

## 2021-02-15 PROCEDURE — 97110 THERAPEUTIC EXERCISES: CPT | Mod: GP | Performed by: PHYSICAL THERAPIST

## 2021-02-15 PROCEDURE — 97161 PT EVAL LOW COMPLEX 20 MIN: CPT | Mod: GP | Performed by: PHYSICAL THERAPIST

## 2021-02-15 PROCEDURE — 97530 THERAPEUTIC ACTIVITIES: CPT | Mod: GP | Performed by: PHYSICAL THERAPIST

## 2021-02-15 NOTE — PROGRESS NOTES
[facility] Initial Evaluation  Subjective:  The history is provided by the patient. No  was used.   Patient Health History  Angeline Aceves being seen for R fibula ORIF.     Problem began: 1/27/2021.   Problem occurred: Slipped on ice   Pain is reported as 0/10 on pain scale.  General health as reported by patient is good.  Pertinent medical history includes: none.     Medical allergies: none.   Surgeries include:  Orthopedic surgery and other. Other surgery history details: Related to cleft lip amd palate.    Current medications:  Pain medication and other.    Current occupation is OT.   Primary job tasks include:  Computer work, lifting/carrying, prolonged sitting, prolonged standing, pushing/pulling and repetitive tasks.                  Therapist Generated HPI Evaluation  Problem details: 1/27/2021 right ankle spiral fibula fracture after slipping on ice; right ankle ORIF 2/2/2021 with stable sendesmosis.    Currently WBAT in CAM walker w B axillary crutches with suture removal scheduled for 2/16/2021..         Type of problem:  Right ankle.    This is a new condition.  Condition occurred with:  A fall/slip.  Where condition occurred: in the community.  Patient reports pain:  Anterior.  Pain is described as other and is intermittent.  Pain radiates to:  No radiation. Pain is the same all the time.  Since onset symptoms are gradually improving.  Associated symptoms:  Loss of motion/stiffness, numbness and tingling. Symptoms are exacerbated by weight bearing  and relieved by bracing/immobilizing and ice.  Special tests included:  X-ray.  Previous treatment includes surgery. There was significant improvement following previous treatment.  Restrictions due to condition include:  Currently not working due to present treatment.  Barriers include:  None as reported by patient.                        Objective:    Gait:    Gait Type:  Antalgic   Weight Bearing Status:  WBAT    Assistive Devices:  CAM and crutches            Ankle/Foot Evaluation  ROM:    AROM:    Dorsiflexion:  Left:   5  Right:   0  Plantarflexion:  Left:  50    Right:  30  Inversion: Left:      Right:  0  Eversion:     Right:  0    Great Toe Extension:  Left:  40     Right: 20  PROM:                  Endfeel:  Supination 35 pronation 30  Strength is not assessed.  LIGAMENT TESTING: not assessed                  EDEMA:   Left ankle edema present at: 19 inches  Right ankle edema present at:  21 inches      Figure 8 left: 19 inchesFigure 8 right: 21 inches                                                      General     ROS    ASSESSMENT/PLAN:    Patient is a 40 year old female with right ankle complaints.    Patient has the following significant findings with corresponding treatment plan.                Diagnosis 1:  S/p right ankle ORIF  Pain -  manual therapy, self management, education, directional preference exercise and home program  Decreased ROM/flexibility - manual therapy and therapeutic exercise  Decreased joint mobility - manual therapy and therapeutic exercise  Decreased strength - therapeutic exercise and therapeutic activities  Decreased proprioception - neuro re-education and therapeutic activities  Impaired gait - gait training  Impaired muscle performance - neuro re-education  Decreased function - therapeutic activities  Impaired posture - neuro re-education    Previous and current functional limitations:  (See Goal Flow Sheet for this information)    Short term and Long term goals: (See Goal Flow Sheet for this information)     Communication ability:  Patient appears to be able to clearly communicate and understand verbal and written communication and follow directions correctly.  Treatment Explanation - The following has been discussed with the patient:   RX ordered/plan of care  Anticipated outcomes  Possible risks and side effects  This patient would benefit from PT intervention to resume normal  activities.   Rehab potential is good.    Frequency:  2X week, once daily for 4 weeks then 1x/week  Duration:  for 8 weeks  Discharge Plan:  Achieve all LTG.  Independent in home treatment program.  Reach maximal therapeutic benefit.    Please refer to the daily flowsheet for treatment today, total treatment time and time spent performing 1:1 timed codes.

## 2021-02-16 ENCOUNTER — OFFICE VISIT (OUTPATIENT)
Dept: ORTHOPEDICS | Facility: CLINIC | Age: 41
End: 2021-02-16
Payer: COMMERCIAL

## 2021-02-16 DIAGNOSIS — Z87.81 S/P ORIF (OPEN REDUCTION INTERNAL FIXATION) FRACTURE: ICD-10-CM

## 2021-02-16 DIAGNOSIS — S82.891A CLOSED FRACTURE OF RIGHT ANKLE, INITIAL ENCOUNTER: Primary | ICD-10-CM

## 2021-02-16 DIAGNOSIS — Z98.890 S/P ORIF (OPEN REDUCTION INTERNAL FIXATION) FRACTURE: ICD-10-CM

## 2021-02-16 PROCEDURE — 99024 POSTOP FOLLOW-UP VISIT: CPT

## 2021-02-16 ASSESSMENT — ACTIVITIES OF DAILY LIVING (ADL)
ACTIVITIES_OF_DAILY_LIVING_MEASURE_SCORE(%):: 35.71
TOTAL_ADL_ITEM_SCORE:: 30
HIGHEST_POTENTIAL_ADL_SCORE:: 84

## 2021-02-16 NOTE — PROGRESS NOTES
Reason for visit:    Angeline Aceves came in to the clinic for a two week post op check.    Her surgery was done 2/2/21by Dr Hoover.  She had Right ankle lateral malleolus ORIF, right syndesmosis exam.     Assessment:    Angeline came into the clinic in tall profile walking boot WBAT with the use of crutches.    Angeline states she has not needed pain medications other than an occasional ibuprofen.  We reviewed tylenol as a good option at this time.    The Surgical wounds were exposed and found to be well-healed and without evidence of infection; so the sutures were removed.  Incision was covered with steri strips, sterile gauze, and tubigrip.    Plan:     She was placed in tall profile walking boot.  She was told to remain  WBAT with use of boot with up walking.       She has an appointment in four weeks for xray only and at that time Dr. Hoover will review and determine further restrictions.    She has our phone number and will call with questions or problems.

## 2021-02-19 ENCOUNTER — THERAPY VISIT (OUTPATIENT)
Dept: PHYSICAL THERAPY | Facility: CLINIC | Age: 41
End: 2021-02-19
Payer: COMMERCIAL

## 2021-02-19 DIAGNOSIS — Z98.890 S/P ORIF (OPEN REDUCTION INTERNAL FIXATION) FRACTURE: ICD-10-CM

## 2021-02-19 DIAGNOSIS — S82.891A CLOSED FRACTURE OF RIGHT ANKLE, INITIAL ENCOUNTER: ICD-10-CM

## 2021-02-19 DIAGNOSIS — Z87.81 S/P ORIF (OPEN REDUCTION INTERNAL FIXATION) FRACTURE: ICD-10-CM

## 2021-02-19 PROCEDURE — 97140 MANUAL THERAPY 1/> REGIONS: CPT | Mod: GP | Performed by: PHYSICAL THERAPIST

## 2021-02-19 PROCEDURE — 97110 THERAPEUTIC EXERCISES: CPT | Mod: GP | Performed by: PHYSICAL THERAPIST

## 2021-02-19 PROCEDURE — 97530 THERAPEUTIC ACTIVITIES: CPT | Mod: GP | Performed by: PHYSICAL THERAPIST

## 2021-02-22 ENCOUNTER — THERAPY VISIT (OUTPATIENT)
Dept: PHYSICAL THERAPY | Facility: CLINIC | Age: 41
End: 2021-02-22
Payer: COMMERCIAL

## 2021-02-22 DIAGNOSIS — S82.891A CLOSED FRACTURE OF RIGHT ANKLE, INITIAL ENCOUNTER: ICD-10-CM

## 2021-02-22 DIAGNOSIS — Z98.890 S/P ORIF (OPEN REDUCTION INTERNAL FIXATION) FRACTURE: ICD-10-CM

## 2021-02-22 DIAGNOSIS — Z87.81 S/P ORIF (OPEN REDUCTION INTERNAL FIXATION) FRACTURE: ICD-10-CM

## 2021-02-22 PROCEDURE — 97530 THERAPEUTIC ACTIVITIES: CPT | Mod: GP | Performed by: PHYSICAL THERAPIST

## 2021-02-22 PROCEDURE — 97110 THERAPEUTIC EXERCISES: CPT | Mod: GP | Performed by: PHYSICAL THERAPIST

## 2021-02-22 PROCEDURE — 97140 MANUAL THERAPY 1/> REGIONS: CPT | Mod: GP | Performed by: PHYSICAL THERAPIST

## 2021-02-26 ENCOUNTER — THERAPY VISIT (OUTPATIENT)
Dept: PHYSICAL THERAPY | Facility: CLINIC | Age: 41
End: 2021-02-26
Payer: COMMERCIAL

## 2021-02-26 DIAGNOSIS — Z87.81 S/P ORIF (OPEN REDUCTION INTERNAL FIXATION) FRACTURE: ICD-10-CM

## 2021-02-26 DIAGNOSIS — Z98.890 S/P ORIF (OPEN REDUCTION INTERNAL FIXATION) FRACTURE: ICD-10-CM

## 2021-02-26 DIAGNOSIS — S82.891A CLOSED FRACTURE OF RIGHT ANKLE, INITIAL ENCOUNTER: ICD-10-CM

## 2021-02-26 PROCEDURE — 97110 THERAPEUTIC EXERCISES: CPT | Mod: GP | Performed by: PHYSICAL THERAPIST

## 2021-02-26 PROCEDURE — 97140 MANUAL THERAPY 1/> REGIONS: CPT | Mod: GP | Performed by: PHYSICAL THERAPIST

## 2021-02-26 PROCEDURE — 97530 THERAPEUTIC ACTIVITIES: CPT | Mod: GP | Performed by: PHYSICAL THERAPIST

## 2021-03-01 ENCOUNTER — THERAPY VISIT (OUTPATIENT)
Dept: PHYSICAL THERAPY | Facility: CLINIC | Age: 41
End: 2021-03-01
Payer: COMMERCIAL

## 2021-03-01 DIAGNOSIS — Z87.81 S/P ORIF (OPEN REDUCTION INTERNAL FIXATION) FRACTURE: ICD-10-CM

## 2021-03-01 DIAGNOSIS — Z98.890 S/P ORIF (OPEN REDUCTION INTERNAL FIXATION) FRACTURE: ICD-10-CM

## 2021-03-01 DIAGNOSIS — S82.891A CLOSED FRACTURE OF RIGHT ANKLE, INITIAL ENCOUNTER: ICD-10-CM

## 2021-03-01 PROCEDURE — 97530 THERAPEUTIC ACTIVITIES: CPT | Mod: GP | Performed by: PHYSICAL THERAPIST

## 2021-03-01 PROCEDURE — 97110 THERAPEUTIC EXERCISES: CPT | Mod: GP | Performed by: PHYSICAL THERAPIST

## 2021-03-01 PROCEDURE — 97140 MANUAL THERAPY 1/> REGIONS: CPT | Mod: GP | Performed by: PHYSICAL THERAPIST

## 2021-03-05 ENCOUNTER — THERAPY VISIT (OUTPATIENT)
Dept: PHYSICAL THERAPY | Facility: CLINIC | Age: 41
End: 2021-03-05
Payer: COMMERCIAL

## 2021-03-05 DIAGNOSIS — S82.891A CLOSED FRACTURE OF RIGHT ANKLE, INITIAL ENCOUNTER: ICD-10-CM

## 2021-03-05 DIAGNOSIS — Z87.81 S/P ORIF (OPEN REDUCTION INTERNAL FIXATION) FRACTURE: ICD-10-CM

## 2021-03-05 DIAGNOSIS — Z98.890 S/P ORIF (OPEN REDUCTION INTERNAL FIXATION) FRACTURE: ICD-10-CM

## 2021-03-05 PROCEDURE — 97110 THERAPEUTIC EXERCISES: CPT | Mod: GP | Performed by: PHYSICAL THERAPIST

## 2021-03-05 PROCEDURE — 97530 THERAPEUTIC ACTIVITIES: CPT | Mod: GP | Performed by: PHYSICAL THERAPIST

## 2021-03-05 PROCEDURE — 97140 MANUAL THERAPY 1/> REGIONS: CPT | Mod: GP | Performed by: PHYSICAL THERAPIST

## 2021-03-08 ENCOUNTER — THERAPY VISIT (OUTPATIENT)
Dept: PHYSICAL THERAPY | Facility: CLINIC | Age: 41
End: 2021-03-08
Payer: COMMERCIAL

## 2021-03-08 DIAGNOSIS — S82.891A CLOSED FRACTURE OF RIGHT ANKLE, INITIAL ENCOUNTER: ICD-10-CM

## 2021-03-08 DIAGNOSIS — Z98.890 S/P ORIF (OPEN REDUCTION INTERNAL FIXATION) FRACTURE: ICD-10-CM

## 2021-03-08 DIAGNOSIS — Z87.81 S/P ORIF (OPEN REDUCTION INTERNAL FIXATION) FRACTURE: ICD-10-CM

## 2021-03-08 PROCEDURE — 97110 THERAPEUTIC EXERCISES: CPT | Mod: GP | Performed by: PHYSICAL THERAPIST

## 2021-03-08 PROCEDURE — 97140 MANUAL THERAPY 1/> REGIONS: CPT | Mod: GP | Performed by: PHYSICAL THERAPIST

## 2021-03-12 ENCOUNTER — THERAPY VISIT (OUTPATIENT)
Dept: PHYSICAL THERAPY | Facility: CLINIC | Age: 41
End: 2021-03-12
Payer: COMMERCIAL

## 2021-03-12 DIAGNOSIS — S82.891A CLOSED FRACTURE OF RIGHT ANKLE, INITIAL ENCOUNTER: ICD-10-CM

## 2021-03-12 DIAGNOSIS — Z98.890 S/P ORIF (OPEN REDUCTION INTERNAL FIXATION) FRACTURE: ICD-10-CM

## 2021-03-12 DIAGNOSIS — Z87.81 S/P ORIF (OPEN REDUCTION INTERNAL FIXATION) FRACTURE: ICD-10-CM

## 2021-03-12 PROCEDURE — 97140 MANUAL THERAPY 1/> REGIONS: CPT | Mod: GP | Performed by: PHYSICAL THERAPIST

## 2021-03-12 PROCEDURE — 97110 THERAPEUTIC EXERCISES: CPT | Mod: GP | Performed by: PHYSICAL THERAPIST

## 2021-03-15 ENCOUNTER — THERAPY VISIT (OUTPATIENT)
Dept: PHYSICAL THERAPY | Facility: CLINIC | Age: 41
End: 2021-03-15
Payer: COMMERCIAL

## 2021-03-15 DIAGNOSIS — Z87.81 S/P ORIF (OPEN REDUCTION INTERNAL FIXATION) FRACTURE: ICD-10-CM

## 2021-03-15 DIAGNOSIS — S82.891A CLOSED FRACTURE OF RIGHT ANKLE, INITIAL ENCOUNTER: ICD-10-CM

## 2021-03-15 DIAGNOSIS — Z98.890 S/P ORIF (OPEN REDUCTION INTERNAL FIXATION) FRACTURE: ICD-10-CM

## 2021-03-15 PROCEDURE — 97110 THERAPEUTIC EXERCISES: CPT | Mod: GP | Performed by: PHYSICAL THERAPIST

## 2021-03-15 PROCEDURE — 97140 MANUAL THERAPY 1/> REGIONS: CPT | Mod: GP | Performed by: PHYSICAL THERAPIST

## 2021-03-15 NOTE — PROGRESS NOTES
PROGRESS  REPORT    Progress reporting period is from 2/15/2021 to 3/15/2021.       SUBJECTIVE  Subjective changes noted by patient:  yes.  Subjective: Mild anterior ankle pinching at end of squat/stairs; medial calf pain if due to increase in time on foot; improvement with elevation, manual releases.  No sharp pain.    Current pain level is 1/10 Current Pain level: 1/10.     Previous pain level was  2/10  .   Changes in function:  Yes (See Goal flowsheet attached for changes in current functional level)  Adverse reaction to treatment or activity: None    OBJECTIVE  Changes noted in objective findings:   Objective: AAROM right ankle DF 10 deg/PF; SLS 10 sec; MMT ankle diagnols 4/5 no pain; toe raise partial.     ASSESSMENT/PLAN  Updated problem list and treatment plan: Diagnosis 1:  S/p right ankle ORIF  Pain -  manual therapy, self management, education and home program  Decreased ROM/flexibility - manual therapy and therapeutic exercise  Decreased strength - therapeutic exercise and therapeutic activities  Impaired balance - neuro re-education and therapeutic activities  Impaired gait - gait training  Impaired muscle performance - neuro re-education  Decreased function - therapeutic activities  STG/LTGs have been met or progress has been made towards goals:  Yes (See Goal flow sheet completed today.)  Assessment of Progress: The patient's condition has potential to improve.  Self Management Plans:  Patient has been instructed in a home treatment program.  I have re-evaluated this patient and find that the nature, scope, duration and intensity of the therapy is appropriate for the medical condition of the patient.  Angeline continues to require the following intervention to meet STG and LTG's:  PT    Recommendations:  This patient would benefit from continued therapy.     Frequency:  1 X week, once daily  Duration:  for 6 weeks        Please refer to the daily flowsheet for treatment today, total treatment time and  time spent performing 1:1 timed codes.

## 2021-03-16 ENCOUNTER — ANCILLARY PROCEDURE (OUTPATIENT)
Dept: GENERAL RADIOLOGY | Facility: CLINIC | Age: 41
End: 2021-03-16
Attending: ORTHOPAEDIC SURGERY
Payer: COMMERCIAL

## 2021-03-16 DIAGNOSIS — Z87.81 S/P ORIF (OPEN REDUCTION INTERNAL FIXATION) FRACTURE: ICD-10-CM

## 2021-03-16 DIAGNOSIS — Z98.890 S/P ORIF (OPEN REDUCTION INTERNAL FIXATION) FRACTURE: ICD-10-CM

## 2021-03-16 DIAGNOSIS — Z98.890 S/P ORIF (OPEN REDUCTION INTERNAL FIXATION) FRACTURE: Primary | ICD-10-CM

## 2021-03-16 DIAGNOSIS — Z87.81 S/P ORIF (OPEN REDUCTION INTERNAL FIXATION) FRACTURE: Primary | ICD-10-CM

## 2021-03-16 DIAGNOSIS — S82.891A CLOSED FRACTURE OF RIGHT ANKLE, INITIAL ENCOUNTER: ICD-10-CM

## 2021-03-16 PROCEDURE — 73610 X-RAY EXAM OF ANKLE: CPT | Mod: RT | Performed by: RADIOLOGY

## 2021-03-19 ENCOUNTER — THERAPY VISIT (OUTPATIENT)
Dept: PHYSICAL THERAPY | Facility: CLINIC | Age: 41
End: 2021-03-19
Payer: COMMERCIAL

## 2021-03-19 DIAGNOSIS — Z87.81 S/P ORIF (OPEN REDUCTION INTERNAL FIXATION) FRACTURE: ICD-10-CM

## 2021-03-19 DIAGNOSIS — S82.891A CLOSED FRACTURE OF RIGHT ANKLE, INITIAL ENCOUNTER: ICD-10-CM

## 2021-03-19 DIAGNOSIS — Z98.890 S/P ORIF (OPEN REDUCTION INTERNAL FIXATION) FRACTURE: ICD-10-CM

## 2021-03-19 PROCEDURE — 97110 THERAPEUTIC EXERCISES: CPT | Mod: GP | Performed by: PHYSICAL THERAPIST

## 2021-03-19 PROCEDURE — 97140 MANUAL THERAPY 1/> REGIONS: CPT | Mod: GP | Performed by: PHYSICAL THERAPIST

## 2021-03-22 ENCOUNTER — THERAPY VISIT (OUTPATIENT)
Dept: PHYSICAL THERAPY | Facility: CLINIC | Age: 41
End: 2021-03-22
Payer: COMMERCIAL

## 2021-03-22 DIAGNOSIS — S82.891A CLOSED FRACTURE OF RIGHT ANKLE, INITIAL ENCOUNTER: ICD-10-CM

## 2021-03-22 DIAGNOSIS — Z87.81 S/P ORIF (OPEN REDUCTION INTERNAL FIXATION) FRACTURE: ICD-10-CM

## 2021-03-22 DIAGNOSIS — Z98.890 S/P ORIF (OPEN REDUCTION INTERNAL FIXATION) FRACTURE: ICD-10-CM

## 2021-03-22 PROCEDURE — 97140 MANUAL THERAPY 1/> REGIONS: CPT | Mod: GP | Performed by: PHYSICAL THERAPIST

## 2021-03-22 PROCEDURE — 97530 THERAPEUTIC ACTIVITIES: CPT | Mod: GP | Performed by: PHYSICAL THERAPIST

## 2021-03-22 PROCEDURE — 97110 THERAPEUTIC EXERCISES: CPT | Mod: GP | Performed by: PHYSICAL THERAPIST

## 2021-03-25 ENCOUNTER — MYC MEDICAL ADVICE (OUTPATIENT)
Dept: FAMILY MEDICINE | Facility: CLINIC | Age: 41
End: 2021-03-25

## 2021-03-25 DIAGNOSIS — N92.0 MENORRHAGIA WITH REGULAR CYCLE: Primary | ICD-10-CM

## 2021-03-25 NOTE — TELEPHONE ENCOUNTER
Patient was seen 12/9/2020 and mentioned heavy periods.  Would you need to see her in clinic or do a virtual visit before ordering US of the pelvis?  Giselle Garcia RN  [facility]    From last office note  N92.0) Menorrhagia with regular cycle  Comment: Tolerating well/etiology uncertain  Plan: For today, I encouraged her to eat a healthy diet, iron rich sources etc.  In the event that her menstrual cycles worsen or bother her, I did tell her I would order an pelvic ultrasound for her.  In the event that she wants that pelvic ultrasound, she will MyChart message me and I will order it.  She is appreciative.  She did decline a pelvic ultrasound today.

## 2021-03-31 ENCOUNTER — THERAPY VISIT (OUTPATIENT)
Dept: PHYSICAL THERAPY | Facility: CLINIC | Age: 41
End: 2021-03-31
Payer: COMMERCIAL

## 2021-03-31 DIAGNOSIS — Z87.81 S/P ORIF (OPEN REDUCTION INTERNAL FIXATION) FRACTURE: ICD-10-CM

## 2021-03-31 DIAGNOSIS — S82.891A CLOSED FRACTURE OF RIGHT ANKLE, INITIAL ENCOUNTER: ICD-10-CM

## 2021-03-31 DIAGNOSIS — Z98.890 S/P ORIF (OPEN REDUCTION INTERNAL FIXATION) FRACTURE: ICD-10-CM

## 2021-03-31 PROCEDURE — 97110 THERAPEUTIC EXERCISES: CPT | Mod: GP | Performed by: PHYSICAL THERAPIST

## 2021-03-31 PROCEDURE — 97530 THERAPEUTIC ACTIVITIES: CPT | Mod: GP | Performed by: PHYSICAL THERAPIST

## 2021-03-31 PROCEDURE — 97140 MANUAL THERAPY 1/> REGIONS: CPT | Mod: GP | Performed by: PHYSICAL THERAPIST

## 2021-04-05 ENCOUNTER — THERAPY VISIT (OUTPATIENT)
Dept: PHYSICAL THERAPY | Facility: CLINIC | Age: 41
End: 2021-04-05
Payer: COMMERCIAL

## 2021-04-05 DIAGNOSIS — Z87.81 S/P ORIF (OPEN REDUCTION INTERNAL FIXATION) FRACTURE: ICD-10-CM

## 2021-04-05 DIAGNOSIS — S82.891A CLOSED FRACTURE OF RIGHT ANKLE, INITIAL ENCOUNTER: ICD-10-CM

## 2021-04-05 DIAGNOSIS — Z98.890 S/P ORIF (OPEN REDUCTION INTERNAL FIXATION) FRACTURE: ICD-10-CM

## 2021-04-05 PROCEDURE — 97530 THERAPEUTIC ACTIVITIES: CPT | Mod: GP | Performed by: PHYSICAL THERAPIST

## 2021-04-05 PROCEDURE — 97112 NEUROMUSCULAR REEDUCATION: CPT | Mod: GP | Performed by: PHYSICAL THERAPIST

## 2021-04-05 PROCEDURE — 97110 THERAPEUTIC EXERCISES: CPT | Mod: GP | Performed by: PHYSICAL THERAPIST

## 2021-04-12 ENCOUNTER — THERAPY VISIT (OUTPATIENT)
Dept: PHYSICAL THERAPY | Facility: CLINIC | Age: 41
End: 2021-04-12
Payer: COMMERCIAL

## 2021-04-12 DIAGNOSIS — S82.891A CLOSED FRACTURE OF RIGHT ANKLE, INITIAL ENCOUNTER: ICD-10-CM

## 2021-04-12 DIAGNOSIS — Z98.890 S/P ORIF (OPEN REDUCTION INTERNAL FIXATION) FRACTURE: ICD-10-CM

## 2021-04-12 DIAGNOSIS — Z87.81 S/P ORIF (OPEN REDUCTION INTERNAL FIXATION) FRACTURE: ICD-10-CM

## 2021-04-12 PROCEDURE — 97110 THERAPEUTIC EXERCISES: CPT | Mod: GP | Performed by: PHYSICAL THERAPIST

## 2021-04-12 PROCEDURE — 97140 MANUAL THERAPY 1/> REGIONS: CPT | Mod: GP | Performed by: PHYSICAL THERAPIST

## 2021-04-12 PROCEDURE — 97112 NEUROMUSCULAR REEDUCATION: CPT | Mod: GP | Performed by: PHYSICAL THERAPIST

## 2021-04-13 ENCOUNTER — ANCILLARY PROCEDURE (OUTPATIENT)
Dept: GENERAL RADIOLOGY | Facility: CLINIC | Age: 41
End: 2021-04-13
Attending: ORTHOPAEDIC SURGERY
Payer: COMMERCIAL

## 2021-04-13 DIAGNOSIS — Z87.81 S/P ORIF (OPEN REDUCTION INTERNAL FIXATION) FRACTURE: ICD-10-CM

## 2021-04-13 DIAGNOSIS — Z98.890 S/P ORIF (OPEN REDUCTION INTERNAL FIXATION) FRACTURE: ICD-10-CM

## 2021-04-13 PROCEDURE — 73610 X-RAY EXAM OF ANKLE: CPT | Mod: RT | Performed by: RADIOLOGY

## 2021-04-26 ENCOUNTER — THERAPY VISIT (OUTPATIENT)
Dept: PHYSICAL THERAPY | Facility: CLINIC | Age: 41
End: 2021-04-26
Payer: COMMERCIAL

## 2021-04-26 DIAGNOSIS — S82.891A CLOSED FRACTURE OF RIGHT ANKLE, INITIAL ENCOUNTER: ICD-10-CM

## 2021-04-26 DIAGNOSIS — Z87.81 S/P ORIF (OPEN REDUCTION INTERNAL FIXATION) FRACTURE: ICD-10-CM

## 2021-04-26 DIAGNOSIS — Z98.890 S/P ORIF (OPEN REDUCTION INTERNAL FIXATION) FRACTURE: ICD-10-CM

## 2021-04-26 PROCEDURE — 97110 THERAPEUTIC EXERCISES: CPT | Mod: GP | Performed by: PHYSICAL THERAPIST

## 2021-04-26 PROCEDURE — 97140 MANUAL THERAPY 1/> REGIONS: CPT | Mod: GP | Performed by: PHYSICAL THERAPIST

## 2021-05-12 ENCOUNTER — THERAPY VISIT (OUTPATIENT)
Dept: PHYSICAL THERAPY | Facility: CLINIC | Age: 41
End: 2021-05-12
Payer: COMMERCIAL

## 2021-05-12 DIAGNOSIS — Z98.890 S/P ORIF (OPEN REDUCTION INTERNAL FIXATION) FRACTURE: ICD-10-CM

## 2021-05-12 DIAGNOSIS — Z87.81 S/P ORIF (OPEN REDUCTION INTERNAL FIXATION) FRACTURE: ICD-10-CM

## 2021-05-12 DIAGNOSIS — S82.891A CLOSED FRACTURE OF RIGHT ANKLE, INITIAL ENCOUNTER: ICD-10-CM

## 2021-05-12 PROCEDURE — 97110 THERAPEUTIC EXERCISES: CPT | Mod: GP | Performed by: PHYSICAL THERAPIST

## 2021-05-12 PROCEDURE — 97140 MANUAL THERAPY 1/> REGIONS: CPT | Mod: GP | Performed by: PHYSICAL THERAPIST

## 2021-06-04 ENCOUNTER — THERAPY VISIT (OUTPATIENT)
Dept: PHYSICAL THERAPY | Facility: CLINIC | Age: 41
End: 2021-06-04
Payer: COMMERCIAL

## 2021-06-04 DIAGNOSIS — Z87.81 S/P ORIF (OPEN REDUCTION INTERNAL FIXATION) FRACTURE: ICD-10-CM

## 2021-06-04 DIAGNOSIS — S82.891A CLOSED FRACTURE OF RIGHT ANKLE, INITIAL ENCOUNTER: ICD-10-CM

## 2021-06-04 DIAGNOSIS — Z98.890 S/P ORIF (OPEN REDUCTION INTERNAL FIXATION) FRACTURE: ICD-10-CM

## 2021-06-04 PROCEDURE — 97110 THERAPEUTIC EXERCISES: CPT | Mod: GP | Performed by: PHYSICAL THERAPIST

## 2021-06-04 PROCEDURE — 97140 MANUAL THERAPY 1/> REGIONS: CPT | Mod: GP | Performed by: PHYSICAL THERAPIST

## 2021-06-04 ASSESSMENT — ACTIVITIES OF DAILY LIVING (ADL)
HIGHEST_POTENTIAL_ADL_SCORE:: 84
TOTAL_ADL_ITEM_SCORE:: 78
ACTIVITIES_OF_DAILY_LIVING_MEASURE_SCORE(%):: 92.86

## 2021-06-04 NOTE — PROGRESS NOTES
DISCHARGE REPORT    Progress reporting period is from IE to 6/5/2021.       SUBJECTIVE  Subjective changes noted by patient:  yes.  Subjective: Generally doing well with feeling of stiffness predominantly; notes lateral ankle tightness sumeet with crossing leg and great toe numbness intermittant    Current pain level is 0/10 Current Pain level: 0/10.     Previous pain level was  2/10  .   Changes in function:  Yes (See Goal flowsheet attached for changes in current functional level)  Adverse reaction to treatment or activity: None    OBJECTIVE  Changes noted in objective findings:  Yes  Objective: Palpable/visual screw head lateral malleoleus, mild neural tension. Functionally doing well with potential restrictions due to hardware     ASSESSMENT/PLAN  Updated problem list and treatment plan: Diagnosis 1:  S/p right ankle ORIF  Impaired muscle performance - neuro re-education and home program  Decreased function - therapeutic activities and home program  STG/LTGs have been met or progress has been made towards goals:  Yes (See Goal flow sheet completed today.)  Assessment of Progress: The patient's condition is improving.  Self Management Plans:  Patient is independent in a home treatment program.  I have re-evaluated this patient and find that the nature, scope, duration and intensity of the therapy is appropriate for the medical condition of the patient.  Angeline continues to require the following intervention to meet STG and LTG's:  PT intervention is no longer required to meet STG/LTG.    Recommendations:  This patient is ready to be discharged from therapy and continue their home treatment program.  May benefit from manual therapy every 2-3 weeks then hardware removal.    Please refer to the daily flowsheet for treatment today, total treatment time and time spent performing 1:1 timed codes.

## 2021-07-08 DIAGNOSIS — Z87.81 S/P ORIF (OPEN REDUCTION INTERNAL FIXATION) FRACTURE: Primary | ICD-10-CM

## 2021-07-08 DIAGNOSIS — Z98.890 S/P ORIF (OPEN REDUCTION INTERNAL FIXATION) FRACTURE: Primary | ICD-10-CM

## 2021-09-03 ENCOUNTER — ANCILLARY PROCEDURE (OUTPATIENT)
Dept: GENERAL RADIOLOGY | Facility: CLINIC | Age: 41
End: 2021-09-03
Attending: ORTHOPAEDIC SURGERY
Payer: COMMERCIAL

## 2021-09-03 DIAGNOSIS — Z98.890 S/P ORIF (OPEN REDUCTION INTERNAL FIXATION) FRACTURE: ICD-10-CM

## 2021-09-03 DIAGNOSIS — Z87.81 S/P ORIF (OPEN REDUCTION INTERNAL FIXATION) FRACTURE: ICD-10-CM

## 2021-09-03 PROCEDURE — 73610 X-RAY EXAM OF ANKLE: CPT | Mod: RT | Performed by: RADIOLOGY

## 2021-09-07 ENCOUNTER — VIRTUAL VISIT (OUTPATIENT)
Dept: ORTHOPEDICS | Facility: CLINIC | Age: 41
End: 2021-09-07
Payer: COMMERCIAL

## 2021-09-07 DIAGNOSIS — M25.571 PAIN IN JOINT, ANKLE AND FOOT, RIGHT: Primary | ICD-10-CM

## 2021-09-07 PROCEDURE — 99213 OFFICE O/P EST LOW 20 MIN: CPT | Mod: TEL | Performed by: ORTHOPAEDIC SURGERY

## 2021-09-07 NOTE — LETTER
9/7/2021         RE: Angeline Aceves  [address] [id number]        Dear Colleague,    Thank you for referring your patient, Angeline Aceves, to the [facility] [place]. Please see a copy of my visit note below.    TELEPHONE VISIT     CHIEF COMPLAINT:  Status post right ankle open reduction and internal fixation performed on 02/02/2021.    HISTORY OF PRESENT ILLNESS:  Angeline was contacted via phone secondary to the pandemic.  The patient authorized the encounter.    She reports to be doing very well; however, she has also reported to have some irritation from the hardware, and she would like to proceed with hardware removal from the right ankle.  I discussed with her the most likely postoperative course and complications from undergoing such intervention, which include, but are not limited to, infection, bleeding, nerve damage, residual pain and stiffness.    All questions were answered and the patient was pleased with the discussion.  The patient will proceed with scheduling the surgery at the best of her convenience, which again will consist of right ankle hardware removal.  In the meantime, she has no activity restrictions.    TT:  20 minutes.  CT:  15 minutes.      Simon Hoover MD

## 2021-09-07 NOTE — PROGRESS NOTES
TELEPHONE VISIT     CHIEF COMPLAINT:  Status post right ankle open reduction and internal fixation performed on 02/02/2021.    HISTORY OF PRESENT ILLNESS:  Angeline was contacted via phone secondary to the pandemic.  The patient authorized the encounter.    She reports to be doing very well; however, she has also reported to have some irritation from the hardware, and she would like to proceed with hardware removal from the right ankle.  I discussed with her the most likely postoperative course and complications from undergoing such intervention, which include, but are not limited to, infection, bleeding, nerve damage, residual pain and stiffness.    All questions were answered and the patient was pleased with the discussion.  The patient will proceed with scheduling the surgery at the best of her convenience, which again will consist of right ankle hardware removal.  In the meantime, she has no activity restrictions.    TT:  20 minutes.  CT:  15 minutes.

## 2021-09-12 ENCOUNTER — HEALTH MAINTENANCE LETTER (OUTPATIENT)
Age: 41
End: 2021-09-12

## 2021-09-13 ENCOUNTER — PREP FOR PROCEDURE (OUTPATIENT)
Dept: ORTHOPEDICS | Facility: CLINIC | Age: 41
End: 2021-09-13

## 2021-09-13 DIAGNOSIS — T84.84XA PAINFUL ORTHOPAEDIC HARDWARE (H): Primary | ICD-10-CM

## 2021-09-14 NOTE — TELEPHONE ENCOUNTER
FUTURE VISIT INFORMATION      SURGERY INFORMATION:    Date: TBD- Ortho    Consult: Virtual visit    RECORDS REQUESTED FROM:       Primary Care Provider: Tri Wagner APRN CNP- [facility]

## 2021-09-24 PROBLEM — T84.84XA PAINFUL ORTHOPAEDIC HARDWARE (H): Status: ACTIVE | Noted: 2021-09-24

## 2021-09-28 DIAGNOSIS — Z11.59 ENCOUNTER FOR SCREENING FOR OTHER VIRAL DISEASES: ICD-10-CM

## 2021-10-04 ENCOUNTER — PRE VISIT (OUTPATIENT)
Dept: SURGERY | Facility: CLINIC | Age: 41
End: 2021-10-04

## 2021-10-04 ENCOUNTER — ANESTHESIA EVENT (OUTPATIENT)
Dept: SURGERY | Facility: CLINIC | Age: 41
End: 2021-10-04

## 2021-10-04 ENCOUNTER — VIRTUAL VISIT (OUTPATIENT)
Dept: SURGERY | Facility: CLINIC | Age: 41
End: 2021-10-04
Payer: COMMERCIAL

## 2021-10-04 DIAGNOSIS — Z01.818 PREOP EXAMINATION: Primary | ICD-10-CM

## 2021-10-04 PROCEDURE — 99203 OFFICE O/P NEW LOW 30 MIN: CPT | Mod: 95 | Performed by: NURSE PRACTITIONER

## 2021-10-04 ASSESSMENT — PAIN SCALES - GENERAL: PAINLEVEL: NO PAIN (0)

## 2021-10-04 ASSESSMENT — LIFESTYLE VARIABLES: TOBACCO_USE: 0

## 2021-10-04 NOTE — PATIENT INSTRUCTIONS
Preparing for Your Surgery      Name:  Angeline Aceves   MRN:  [id number]   :  1980   Today's Date:  10/4/2021         Arriving for surgery:  Surgery date:  10/13/2021  Arrival time:  5:45 am     Restrictions due to COVID 19:  One consistent visitor is allowed per patient  No ill visitors  All visitors must wear face mask     parking is available for anyone with mobility limitations or disabilities. (Monday- Friday 7 am- 5 pm)    Please come to:    [facility]  [address] [place] [id number]    Please check in on the 5th floor at the Ambulatory Surgery Center       What can I eat or drink?    -  You may eat and drink normally until 8 hours before surgery. (Until 10/12/21 at 11 pm)  -  You may have clear liquids up to 4 hours before surgery. (Until 3 am)  Examples of clear liquids:  Water  Clear broth  Juices (apple, white grape, white cranberry  and cider) without pulp  Noncarbonated, powder based beverages  (lemonade and Sravan-Aid)  Sodas (Sprite, 7-Up, ginger ale and seltzer)  Coffee or tea (without milk or cream)  Gatorade    --No alcohol for at least 24 hours before surgery    Which medicines can I take?    Hold Aspirin for 7 days before surgery.   Hold Multivitamins for 7 days before surgery.  Hold Supplements for 7 days before surgery.    Hold Ibuprofen (Advil, Motrin) for 1 day before surgery    Hold Naproxen (Aleve) for 4 days before surgery.          -  PLEASE TAKE the following medications the day of surgery   NONE      How do I prepare myself?  - Please take 2 showers before surgery using Scrubcare or Hibiclens soap.    Use this soap only from the neck to your toes.     Leave the soap on your skin for one minute--then rinse thoroughly.      You may use your own shampoo and conditioner; no other hair products.   - Please remove all jewelry and body piercings.  - No lotions, deodorants or fragrance.  - No makeup or fingernail polish.   - Bring your ID and  insurance card.    -If you have a Deep Brain Stimulator, a Spinal Cord Stimulator or any implanted Neuro Device you must bring the remote to the Surgery Center         - All patients are required to have a Covid-19 test within 4 days of surgery/procedure.      -Patients will be contacted by the [facility] scheduling team within 1 week of surgery to make an appointment.      - Patients may call the Scheduling team at [phone] if they have not been scheduled within 4 days of  surgery.      ALL PATIENTS ARE REQUIRED TO HAVE A RESPONSIBLE ADULT TO DRIVE AND BE IN ATTENDANCE WITH THEM FOR 24 HOURS FOLLOWING SURGERY       Questions or Concerns:    -For questions regarding the day of surgery please contact the Ambulatory Surgery Center at [phone].    -If you have health changes between today and your surgery please contact your surgeon.     For questions after surgery please call your surgeons office.

## 2021-10-04 NOTE — PROGRESS NOTES
Angeline is a 41 year old who is being evaluated via a billable video visit.      How would you like to obtain your AVS? MyChart  If the video visit is dropped, the invitation should be resent by: Text to cell phone: [phone]      HPI       Review of Systems         Objective    Vitals - Patient Reported  Pain Score: No Pain (0)        Physical Exam

## 2021-10-04 NOTE — ANESTHESIA PREPROCEDURE EVALUATION
Anesthesia Pre-Procedure Evaluation    Patient: Angeline Aceves   MRN: [id number] : 1980        Preoperative Diagnosis: * No surgery found *    Procedure :   Video Visit       Past Medical History:   Diagnosis Date     Headache 2014     Headache      Headache      Irritable bowel syndrome 2015     Unilateral deafness 2009    Left ear (Problem list name updated by automated process. Provider to review and confirm.)      Past Surgical History:   Procedure Laterality Date     C ANESTH,CLEFT PALATE REPAIR      10 surgeries 2 weeks-16 years of age     ENT SURGERY      Tubes as a child, exploratory on right in '97     OPEN REDUCTION INTERNAL FIXATION ANKLE Right 2021    Procedure: Open reduction internal fixation Right ankle;  Surgeon: Simon Hoover MD;  Location: [facility] OR      No Known Allergies   Social History     Tobacco Use     Smoking status: Never Smoker     Smokeless tobacco: Never Used   Substance Use Topics     Alcohol use: Yes     Alcohol/week: 1.7 - 2.5 standard drinks     Comment: 3-4 drinks per week      Wt Readings from Last 1 Encounters:   21 79.4 kg (175 lb)        Anesthesia Evaluation   Pt has had prior anesthetic. Type: General and MAC.    No history of anesthetic complications       ROS/MED HX  ENT/Pulmonary: Comment: Mandible surgery as a teenager with subsequent ear issues leading to loss of hearing in left ear.  Right ear hearing normal.     S/p surgical repair of cleft lip and palate as a child.  Denies ongoing issues.      (+) FERNANDEZ risk factors, obese, allergic rhinitis,  (-) tobacco use   Neurologic:  - neg neurologic ROS     Cardiovascular:  - neg cardiovascular ROS     METS/Exercise Tolerance: >4 METS Comment: Exercises by walking 15-20 minutes.    Hematologic:  - neg hematologic  ROS     Musculoskeletal:  - neg musculoskeletal ROS     GI/Hepatic: Comment: IBS fluctuates b/w diarrhea and constipation - neg GI/hepatic ROS     Renal/Genitourinary:  -  neg Renal ROS     Endo:     (+) Obesity,     Psychiatric/Substance Use:  - neg psychiatric ROS     Infectious Disease: Comment: Covid vaccine completed.       Malignancy:  - neg malignancy ROS     Other:      (+) LMP: 9/11/2021, ,            OUTSIDE LABS:  CBC:   Lab Results   Component Value Date    HGB 12.1 02/01/2021    HGB 12.2 10/02/2019     BMP:   Lab Results   Component Value Date    GLC 84 10/02/2019    GLC 83 07/11/2017     COAGS: No results found for: PTT, INR, FIBR  POC:   Lab Results   Component Value Date    HCG Negative 02/02/2021     HEPATIC: No results found for: ALBUMIN, PROTTOTAL, ALT, AST, GGT, ALKPHOS, BILITOTAL, BILIDIRECT, DENICE  OTHER: No results found for: PH, LACT, A1C, PALMIRA, PHOS, MAG, LIPASE, AMYLASE, TSH, T4, T3, CRP, SED          PAC Discussion and Assessment    ASA Classification: 1  Case is suitable for: ASC                    PAC Resident/NP Anesthesia Assessment: Angeline Aceves is a 41-year-old female scheduled for Hardware removal Right ankle - Right with Simon Hoover MD on 10/13/2021 at [facility] under choice anesthesia.     Ms. Aceves suffered a right ankle lateral malleolus fracture and is status post right ankle open reduction and internal fixation with Dr. Hoover performed on 02/02/2021.  Since this surgery, Ms. Aceves has been having some irritation from the hardware.  She followed up with Dr. Hoover via telephone visit on 9/7/2021 and they discussed hardware removal from the right ankle.  Dr. Hoover counseled her on the procedure.  Ms. Aceves presents to PAC virtually today in preparation for the above procedure.     Diagnosis  Painful orthopaedic hardware (H)      Painful orthopaedic hardware (H)   The patient's records and results personally reviewed by this provider.     Outside records reviewed from: care everywhere    ASSESSMENT and PLAN    Specific operative considerations:   - FERNANDEZ # of risks 1/8 = low  - VTE risk:  0.26%  - Risk of PONV score =  2.  If > 2, anti-emetic intervention recommended.      #  Cardiology   - Denies known coronary artery disease.  Denies cardiac symptoms. METS:  >4. Low risk procedure. RCRI : No serious cardiac risks.  0.4 % risk of major adverse cardiac event.         #  Pulmonary   - no smoking hx  - Denies pulmonary symptoms  - No inhalers     #  Hematology   - Denies hx of bleeding or clotting disorder  - Denies h/o blood transfusion  - Denies taking blood thinners     #  Endocrine     -  Obesity, BMI >30.0     #  Musculoskeletal   - right ankle lateral malleolus fracture s/p right ankle open reduction and internal fixation with Dr. Hoover performed on . Now with painful hardware. Above procedure planned.      # GI  - IBS, stable    #  HEENT  - Seasonal allergies, currently stable and not taking any medication  - Multiple ENT procedures in the past with cleft lip and palate repair as a child.  As teenager, mandible surgery complicated by left ear deafness. Right ear with appropriate hearing.  Denies any adverse issues with anesthesia.        #  ID  - COVID-19 testing per surgeon's office  - Completed COVID vaccine     #   Anesthesia considerations   -  Refer to PAC assessment in anesthesia records      Arrival time, NPO, shower and medication instructions provided by nursing staff today.    Patient is an acceptable candidate for the proposed procedure.  No further diagnostic evaluation is needed.     **For further details of assessment, testing, and physical exam please see H and P completed on same date.      TYPE OF VISIT  Type of service:  Video Visit    Patient verbally consented to video service today: YES    Two identifiers used:  yes (name and )    Video Start Time: 12:27  Video End Time (time video stopped): 12:40    Originating Location (pt. Location): Home    Distant Location (provider location):  home    Mode of Communication:  Video Conference via [place]    Please note, because this was a virtual  visit, a full physical could not be completed.  On the DOS, the OOD of anesthesia will complete the appropriate components of the physical exam.       Reviewed and Signed by PAC Mid-Level Provider/Resident  Mid-Level Provider/Resident: Delmy Lam APRN  Date: 10/4/2021                                 Delmy Lam, HEDY CNP

## 2021-10-04 NOTE — H&P
Pre-Operative H & P     CC:  Preoperative exam to assess for increased cardiopulmonary risk while undergoing surgery and anesthesia.    Date of Encounter: 10/4/2021  Primary Care Physician:  Tri Wagner  Angeline Aceves is a 41 year old female who presents for pre-operative H & P in preparation for  Hardware removal Right ankle - Right with Simon Hoover MD on 10/13/2021 at [facility] under choice anesthesia.     Ms. Aceves suffered a right ankle lateral malleolus fracture and is status post right ankle open reduction and internal fixation with Dr. Hoover performed on 02/02/2021.  Since this surgery, Ms. Aceves has been having some irritation from the hardware.  She followed up with Dr. Hoover via telephone visit on 9/7/2021 and they discussed hardware removal from the right ankle.  Dr. Hoover counseled her on the procedure.  Ms. Aceves presents to PAC virtually today in preparation for the above procedure.     Diagnosis  Painful orthopaedic hardware (H)     History is obtained from the patient and electronic health record.    ~ The patient's records and results personally reviewed by this provider. Outside records reviewed from: care everywhere    Past Medical History  Past Medical History:   Diagnosis Date     Headache 6/20/2014     Headache      Headache      Irritable bowel syndrome 9/25/2015     Unilateral deafness 2/12/2009    Left ear (Problem list name updated by automated process. Provider to review and confirm.)       Past Surgical History  Past Surgical History:   Procedure Laterality Date     C ANESTH,CLEFT PALATE REPAIR      10 surgeries 2 weeks-16 years of age     ENT SURGERY      Tubes as a child, exploratory on right in '97     OPEN REDUCTION INTERNAL FIXATION ANKLE Right 2/2/2021    Procedure: Open reduction internal fixation Right ankle;  Surgeon: Simon Hoover MD;  Location: [facility] OR       Hx of Blood transfusions/reactions: denies     Hx of  abnormal bleeding or anti-platelet use: denies    Menstrual history: Patient's last menstrual period was 09/11/2021.    Steroid use in the last year: denies    Personal or FH with difficulty with Anesthesia:  See below    Prior to Admission Medications  Current Outpatient Medications   Medication Sig Dispense Refill     ibuprofen (ADVIL/MOTRIN) 600 MG tablet Take 1 tablet (600 mg) by mouth every 6 hours as needed for moderate pain 24 tablet 0       Allergies  No Known Allergies    Social History  Social History     Socioeconomic History     Marital status: Single     Spouse name: Not on file     Number of children: Not on file     Years of education: Not on file     Highest education level: Not on file   Occupational History     Occupation: occupational therapist     Employer: [employer]   Tobacco Use     Smoking status: Never Smoker     Smokeless tobacco: Never Used   Substance and Sexual Activity     Alcohol use: Yes     Alcohol/week: 1.7 - 2.5 standard drinks     Comment: 3-4 drinks per week     Drug use: No     Sexual activity: Yes     Partners: Male   Other Topics Concern     Parent/sibling w/ CABG, MI or angioplasty before 65F 55M? No   Social History Narrative    Dairy/d 2 servings/d.     Caffeine 1-2 servings/d    Exercise 2-3 x week    Sunscreen used - sometimes    Seatbelts used - Yes    Working smoke/CO detectors in the home - yes    Guns stored in the home - No    Self Breast Exams - No    Self Testicular Exam - NOT APPLICABLE    Eye Exam up to date - Yes    Dental Exam up to date - yes    Pap Smear up to date - no    Mammogram up to date - NOT APPLICABLE    PSA up to date - NOT APPLICABLE    Dexa Scan up to date - NOT APPLICABLE    Flex Sig / Colonoscopy up to date - NOT APPLICABLE    Immunizations up to date - Yes td 11/05    Abuse: Current or Past(Physical, Sexual or Emotional)- No    Do you feel safe in your environment - Yes    2/09     Social Determinants of Health     Financial  Resource Strain:      Difficulty of Paying Living Expenses:    Food Insecurity:      Worried About Running Out of Food in the Last Year:      Ran Out of Food in the Last Year:    Transportation Needs:      Lack of Transportation (Medical):      Lack of Transportation (Non-Medical):    Physical Activity:      Days of Exercise per Week:      Minutes of Exercise per Session:    Stress:      Feeling of Stress :    Social Connections:      Frequency of Communication with Friends and Family:      Frequency of Social Gatherings with Friends and Family:      Attends Gnosticist Services:      Active Member of Clubs or Organizations:      Attends Club or Organization Meetings:      Marital Status:    Intimate Partner Violence:      Fear of Current or Ex-Partner:      Emotionally Abused:      Physically Abused:      Sexually Abused:        Family History  Family History   Problem Relation Age of Onset     Diabetes Maternal Grandfather      Cancer - colorectal Paternal Grandmother      Cerebrovascular Disease Paternal Grandmother      Colon Cancer Paternal Grandmother          Anesthesia Evaluation   Pt has had prior anesthetic. Type: General and MAC.    No history of anesthetic complications       ROS/MED HX  ENT/Pulmonary: Comment: Mandible surgery as a teenager with subsequent ear issues leading to loss of hearing in left ear.  Right ear hearing normal.     S/p surgical repair of cleft lip and palate as a child.  Denies ongoing issues.      (+) FERNANDEZ risk factors, obese, allergic rhinitis,  (-) tobacco use   Neurologic:  - neg neurologic ROS     Cardiovascular:  - neg cardiovascular ROS     METS/Exercise Tolerance: >4 METS Comment: Exercises by walking 15-20 minutes.    Hematologic:  - neg hematologic  ROS     Musculoskeletal:  - neg musculoskeletal ROS     GI/Hepatic: Comment: IBS fluctuates b/w diarrhea and constipation - neg GI/hepatic ROS     Renal/Genitourinary:  - neg Renal ROS     Endo:     (+) Obesity,      Psychiatric/Substance Use:  - neg psychiatric ROS     Infectious Disease: Comment: Covid vaccine completed.       Malignancy:  - neg malignancy ROS     Other:      (+) LMP: 9/11/2021, ,         No vital signs taken since this is a virtual visit.       Physical Exam  Constitutional: Awake, alert, cooperative, no apparent distress, and appears stated age.  HENT: Normocephalic.  Well healed superior lip surgical repair.    Respiratory: Regular respiratory rate.  Effort is non-labored, quiet, easy breathing.   Musculoskeletal:  Full ROM of neck.   Neurologic: Awake, alert, oriented to name, place and time.   Neuropsychiatric: Calm, cooperative. Normal affect.     **Please note, because this was a virtual visit due to COVID -19 pandemic, a full physical could not be completed.  On the DOS, the OOD of anesthesia will complete the appropriate components of the physical exam. Please refer to the physical examination documented by the anesthesiologist in the anesthesia record on the day of surgery. **    LABS:  CBC:   Lab Results   Component Value Date    HGB 12.1 02/01/2021    HGB 12.2 10/02/2019     BMP:   Lab Results   Component Value Date    GLC 84 10/02/2019    GLC 83 07/11/2017     COAGS: No results found for: PTT, INR, FIBR  POC:   Lab Results   Component Value Date    HCG Negative 02/02/2021     ASSESSMENT and PLAN    Specific operative considerations:   - FERNANDEZ # of risks 1/8 = low  - VTE risk:  0.26%  - Risk of PONV score = 2.  If > 2, anti-emetic intervention recommended.    #  Cardiology   - Denies known coronary artery disease.  Denies cardiac symptoms. METS:  >4. Low risk procedure. RCRI : No serious cardiac risks.  0.4 % risk of major adverse cardiac event.         #  Pulmonary   - no smoking hx  - Denies pulmonary symptoms  - No inhalers     #  Hematology   - Denies hx of bleeding or clotting disorder(s)  - Denies h/o blood transfusion  - Denies taking blood thinners     #  Endocrine     -  Obesity, BMI >30.0      #  Musculoskeletal   - right ankle lateral malleolus fracture s/p right ankle open reduction and internal fixation with Dr. Hoover performed on . Now with painful hardware. Above procedure planned.      # GI  - IBS, stable    #  HEENT  - Seasonal allergies, currently stable and not taking any medication  - Multiple ENT procedures in the past with cleft lip and palate repair as a child.  As teenager, mandible surgery complicated by left ear deafness. Right ear with appropriate hearing.  Denies any adverse issues with anesthesia.        #  ID  - COVID-19 testing per surgeon's office  - Completed COVID vaccine     #   Anesthesia considerations   -  Refer to PAC assessment in anesthesia records      Arrival time, NPO, shower and medication instructions provided by nursing staff today.    Patient is an acceptable candidate for the proposed procedure.  No further diagnostic evaluation is needed.     ---    TYPE OF VISIT  Type of service:  Video Visit    Patient verbally consented to video service today: YES    Two identifiers used:  yes (name and )    Video Start Time: 12:27  Video End Time (time video stopped): 12:40    Originating Location (pt. Location): Home    Distant Location (provider location):  home    Mode of Communication:  Video Conference via [employer]    Please note, because this was a virtual visit, a full physical could not be completed.  On the DOS, the OOD of anesthesia will complete the appropriate components of the physical exam.    HEDY Lindsay CNP  Preoperative Assessment Center  [facility]  Phone: [phone]  Fax: [phone]

## 2021-10-08 ASSESSMENT — MIFFLIN-ST. JEOR: SCORE: 1437

## 2021-10-11 ENCOUNTER — LAB (OUTPATIENT)
Dept: LAB | Facility: CLINIC | Age: 41
End: 2021-10-11
Attending: ORTHOPAEDIC SURGERY
Payer: COMMERCIAL

## 2021-10-11 DIAGNOSIS — Z11.59 ENCOUNTER FOR SCREENING FOR OTHER VIRAL DISEASES: ICD-10-CM

## 2021-10-11 LAB — SARS-COV-2 RNA RESP QL NAA+PROBE: NEGATIVE

## 2021-10-11 PROCEDURE — U0005 INFEC AGEN DETEC AMPLI PROBE: HCPCS | Mod: 90 | Performed by: PATHOLOGY

## 2021-10-11 PROCEDURE — U0003 INFECTIOUS AGENT DETECTION BY NUCLEIC ACID (DNA OR RNA); SEVERE ACUTE RESPIRATORY SYNDROME CORONAVIRUS 2 (SARS-COV-2) (CORONAVIRUS DISEASE [COVID-19]), AMPLIFIED PROBE TECHNIQUE, MAKING USE OF HIGH THROUGHPUT TECHNOLOGIES AS DESCRIBED BY CMS-2020-01-R: HCPCS | Mod: 90 | Performed by: PATHOLOGY

## 2021-10-12 ENCOUNTER — ANESTHESIA EVENT (OUTPATIENT)
Dept: SURGERY | Facility: AMBULATORY SURGERY CENTER | Age: 41
End: 2021-10-12
Payer: COMMERCIAL

## 2021-10-12 RX ORDER — FENTANYL CITRATE 50 UG/ML
25 INJECTION, SOLUTION INTRAMUSCULAR; INTRAVENOUS
Status: CANCELLED | OUTPATIENT
Start: 2021-10-12

## 2021-10-13 ENCOUNTER — HOSPITAL ENCOUNTER (OUTPATIENT)
Facility: AMBULATORY SURGERY CENTER | Age: 41
End: 2021-10-13
Attending: ORTHOPAEDIC SURGERY
Payer: COMMERCIAL

## 2021-10-13 ENCOUNTER — ANESTHESIA (OUTPATIENT)
Dept: SURGERY | Facility: AMBULATORY SURGERY CENTER | Age: 41
End: 2021-10-13
Payer: COMMERCIAL

## 2021-10-13 VITALS
DIASTOLIC BLOOD PRESSURE: 75 MMHG | HEIGHT: 61 IN | RESPIRATION RATE: 16 BRPM | OXYGEN SATURATION: 100 % | BODY MASS INDEX: 33.04 KG/M2 | WEIGHT: 175 LBS | TEMPERATURE: 97 F | HEART RATE: 71 BPM | SYSTOLIC BLOOD PRESSURE: 122 MMHG

## 2021-10-13 DIAGNOSIS — T84.84XA PAINFUL ORTHOPAEDIC HARDWARE (H): ICD-10-CM

## 2021-10-13 DIAGNOSIS — M25.571 CHRONIC PAIN OF RIGHT ANKLE: Primary | ICD-10-CM

## 2021-10-13 DIAGNOSIS — G89.29 CHRONIC PAIN OF RIGHT ANKLE: Primary | ICD-10-CM

## 2021-10-13 LAB
HCG UR QL: NEGATIVE
INTERNAL QC OK POCT: NORMAL

## 2021-10-13 PROCEDURE — 20680 REMOVAL OF IMPLANT DEEP: CPT | Mod: RT

## 2021-10-13 PROCEDURE — 20680 REMOVAL OF IMPLANT DEEP: CPT | Mod: RT | Performed by: ORTHOPAEDIC SURGERY

## 2021-10-13 RX ORDER — OXYCODONE HYDROCHLORIDE 5 MG/1
5 TABLET ORAL EVERY 4 HOURS PRN
Status: DISCONTINUED | OUTPATIENT
Start: 2021-10-13 | End: 2021-10-14 | Stop reason: HOSPADM

## 2021-10-13 RX ORDER — FENTANYL CITRATE 50 UG/ML
INJECTION, SOLUTION INTRAMUSCULAR; INTRAVENOUS PRN
Status: DISCONTINUED | OUTPATIENT
Start: 2021-10-13 | End: 2021-10-13

## 2021-10-13 RX ORDER — BUPIVACAINE HYDROCHLORIDE 5 MG/ML
INJECTION, SOLUTION PERINEURAL PRN
Status: DISCONTINUED | OUTPATIENT
Start: 2021-10-13 | End: 2021-10-13 | Stop reason: HOSPADM

## 2021-10-13 RX ORDER — PROPOFOL 10 MG/ML
INJECTION, EMULSION INTRAVENOUS CONTINUOUS PRN
Status: DISCONTINUED | OUTPATIENT
Start: 2021-10-13 | End: 2021-10-13

## 2021-10-13 RX ORDER — CEFAZOLIN SODIUM 2 G/50ML
2 SOLUTION INTRAVENOUS SEE ADMIN INSTRUCTIONS
Status: DISCONTINUED | OUTPATIENT
Start: 2021-10-13 | End: 2021-10-13 | Stop reason: HOSPADM

## 2021-10-13 RX ORDER — ACETAMINOPHEN 325 MG/1
975 TABLET ORAL ONCE
Status: COMPLETED | OUTPATIENT
Start: 2021-10-13 | End: 2021-10-13

## 2021-10-13 RX ORDER — LIDOCAINE HYDROCHLORIDE 20 MG/ML
INJECTION, SOLUTION INFILTRATION; PERINEURAL PRN
Status: DISCONTINUED | OUTPATIENT
Start: 2021-10-13 | End: 2021-10-13

## 2021-10-13 RX ORDER — ONDANSETRON 2 MG/ML
4 INJECTION INTRAMUSCULAR; INTRAVENOUS EVERY 30 MIN PRN
Status: DISCONTINUED | OUTPATIENT
Start: 2021-10-13 | End: 2021-10-14 | Stop reason: HOSPADM

## 2021-10-13 RX ORDER — ONDANSETRON 2 MG/ML
INJECTION INTRAMUSCULAR; INTRAVENOUS PRN
Status: DISCONTINUED | OUTPATIENT
Start: 2021-10-13 | End: 2021-10-13

## 2021-10-13 RX ORDER — GLYCOPYRROLATE 0.2 MG/ML
INJECTION, SOLUTION INTRAMUSCULAR; INTRAVENOUS PRN
Status: DISCONTINUED | OUTPATIENT
Start: 2021-10-13 | End: 2021-10-13

## 2021-10-13 RX ORDER — SODIUM CHLORIDE, SODIUM LACTATE, POTASSIUM CHLORIDE, CALCIUM CHLORIDE 600; 310; 30; 20 MG/100ML; MG/100ML; MG/100ML; MG/100ML
INJECTION, SOLUTION INTRAVENOUS CONTINUOUS
Status: DISCONTINUED | OUTPATIENT
Start: 2021-10-13 | End: 2021-10-14 | Stop reason: HOSPADM

## 2021-10-13 RX ORDER — DEXAMETHASONE SODIUM PHOSPHATE 4 MG/ML
INJECTION, SOLUTION INTRA-ARTICULAR; INTRALESIONAL; INTRAMUSCULAR; INTRAVENOUS; SOFT TISSUE PRN
Status: DISCONTINUED | OUTPATIENT
Start: 2021-10-13 | End: 2021-10-13

## 2021-10-13 RX ORDER — ONDANSETRON 4 MG/1
4 TABLET, ORALLY DISINTEGRATING ORAL EVERY 30 MIN PRN
Status: DISCONTINUED | OUTPATIENT
Start: 2021-10-13 | End: 2021-10-14 | Stop reason: HOSPADM

## 2021-10-13 RX ORDER — LIDOCAINE 40 MG/G
CREAM TOPICAL
Status: DISCONTINUED | OUTPATIENT
Start: 2021-10-13 | End: 2021-10-13 | Stop reason: HOSPADM

## 2021-10-13 RX ORDER — SODIUM CHLORIDE, SODIUM LACTATE, POTASSIUM CHLORIDE, CALCIUM CHLORIDE 600; 310; 30; 20 MG/100ML; MG/100ML; MG/100ML; MG/100ML
INJECTION, SOLUTION INTRAVENOUS CONTINUOUS
Status: DISCONTINUED | OUTPATIENT
Start: 2021-10-13 | End: 2021-10-13 | Stop reason: HOSPADM

## 2021-10-13 RX ORDER — FENTANYL CITRATE 50 UG/ML
25 INJECTION, SOLUTION INTRAMUSCULAR; INTRAVENOUS EVERY 5 MIN PRN
Status: DISCONTINUED | OUTPATIENT
Start: 2021-10-13 | End: 2021-10-14 | Stop reason: HOSPADM

## 2021-10-13 RX ORDER — MEPERIDINE HYDROCHLORIDE 25 MG/ML
12.5 INJECTION INTRAMUSCULAR; INTRAVENOUS; SUBCUTANEOUS
Status: DISCONTINUED | OUTPATIENT
Start: 2021-10-13 | End: 2021-10-14 | Stop reason: HOSPADM

## 2021-10-13 RX ORDER — SODIUM CHLORIDE, SODIUM LACTATE, POTASSIUM CHLORIDE, CALCIUM CHLORIDE 600; 310; 30; 20 MG/100ML; MG/100ML; MG/100ML; MG/100ML
INJECTION, SOLUTION INTRAVENOUS CONTINUOUS PRN
Status: DISCONTINUED | OUTPATIENT
Start: 2021-10-13 | End: 2021-10-13

## 2021-10-13 RX ORDER — CEFAZOLIN SODIUM 2 G/50ML
2 SOLUTION INTRAVENOUS
Status: COMPLETED | OUTPATIENT
Start: 2021-10-13 | End: 2021-10-13

## 2021-10-13 RX ORDER — PROPOFOL 10 MG/ML
INJECTION, EMULSION INTRAVENOUS PRN
Status: DISCONTINUED | OUTPATIENT
Start: 2021-10-13 | End: 2021-10-13

## 2021-10-13 RX ORDER — KETOROLAC TROMETHAMINE 30 MG/ML
INJECTION, SOLUTION INTRAMUSCULAR; INTRAVENOUS PRN
Status: DISCONTINUED | OUTPATIENT
Start: 2021-10-13 | End: 2021-10-13

## 2021-10-13 RX ORDER — HYDROMORPHONE HYDROCHLORIDE 1 MG/ML
0.2 INJECTION, SOLUTION INTRAMUSCULAR; INTRAVENOUS; SUBCUTANEOUS EVERY 5 MIN PRN
Status: DISCONTINUED | OUTPATIENT
Start: 2021-10-13 | End: 2021-10-14 | Stop reason: HOSPADM

## 2021-10-13 RX ADMIN — DEXAMETHASONE SODIUM PHOSPHATE 4 MG: 4 INJECTION, SOLUTION INTRA-ARTICULAR; INTRALESIONAL; INTRAMUSCULAR; INTRAVENOUS; SOFT TISSUE at 07:05

## 2021-10-13 RX ADMIN — PROPOFOL 200 MCG/KG/MIN: 10 INJECTION, EMULSION INTRAVENOUS at 07:08

## 2021-10-13 RX ADMIN — CEFAZOLIN SODIUM 2 G: 2 SOLUTION INTRAVENOUS at 07:02

## 2021-10-13 RX ADMIN — GLYCOPYRROLATE 0.2 MG: 0.2 INJECTION, SOLUTION INTRAMUSCULAR; INTRAVENOUS at 07:05

## 2021-10-13 RX ADMIN — LIDOCAINE HYDROCHLORIDE 100 MG: 20 INJECTION, SOLUTION INFILTRATION; PERINEURAL at 07:07

## 2021-10-13 RX ADMIN — ACETAMINOPHEN 975 MG: 325 TABLET ORAL at 06:15

## 2021-10-13 RX ADMIN — FENTANYL CITRATE 50 MCG: 50 INJECTION, SOLUTION INTRAMUSCULAR; INTRAVENOUS at 07:10

## 2021-10-13 RX ADMIN — ONDANSETRON 4 MG: 2 INJECTION INTRAMUSCULAR; INTRAVENOUS at 07:05

## 2021-10-13 RX ADMIN — OXYCODONE HYDROCHLORIDE 5 MG: 5 TABLET ORAL at 08:04

## 2021-10-13 RX ADMIN — FENTANYL CITRATE 50 MCG: 50 INJECTION, SOLUTION INTRAMUSCULAR; INTRAVENOUS at 07:05

## 2021-10-13 RX ADMIN — KETOROLAC TROMETHAMINE 30 MG: 30 INJECTION, SOLUTION INTRAMUSCULAR; INTRAVENOUS at 07:10

## 2021-10-13 RX ADMIN — PROPOFOL 200 MG: 10 INJECTION, EMULSION INTRAVENOUS at 07:08

## 2021-10-13 RX ADMIN — SODIUM CHLORIDE, SODIUM LACTATE, POTASSIUM CHLORIDE, CALCIUM CHLORIDE: 600; 310; 30; 20 INJECTION, SOLUTION INTRAVENOUS at 06:22

## 2021-10-13 ASSESSMENT — LIFESTYLE VARIABLES: TOBACCO_USE: 0

## 2021-10-13 ASSESSMENT — MIFFLIN-ST. JEOR: SCORE: 1396.17

## 2021-10-13 NOTE — DISCHARGE INSTRUCTIONS
"[facility]  Home Care Following Anesthesia  For 24 hours after surgery:  1. Get plenty of rest.  A responsible adult must stay with you for at least 24 hours after you leave the surgery center.  2. Do not drive or use heavy equipment.  If you have weakness or tingling, don't drive or use heavy equipment until this feeling goes away.   3. Do not drink alcohol.   4. Avoid strenuous or risky activities.  Ask for help when climbing stairs.  5. You may feel lightheaded.  IF so, sit for a few minutes before standing.  Have someone help you get up.   6. If you have nausea (feel sick to your stomach): Drink only clear liquids such as apple juice, ginger ale, broth or 7-Up.  Rest may also help.  Be sure to drink enough fluids.  Move to a regular diet as you feel able.   7. You may have a slight fever.  Call the doctor if your fever is over 100 F (37.7 C) (taken under the tongue) or lasts longer than 24 hours.  8. You may have a dry mouth, a sore throat, muscle aches or trouble sleeping. These should go away after 24 hours.  9. Do not make important or legal decisions.   10. It is recommended to avoid smoking.   If you use hormonal birth control (such as the pill, patch, ring or implants):  You will need a second form of birth control for 7 days (condoms, a diaphragm or contraceptive foam).  While in the surgery center, you received a medicine called Sugammadex.  Hormonal birth control (such as the pill, patch, ring or implants) will not work as well for a week after taking this medicine.  Today you received a Marcaine or bupivacaine block to numb the nerves near your surgery site.  This is a block using local anesthetic or \"numbing\" medication injected around the nerves to anesthetize or \"numb\" the area supplied by those nerves.  This block is injected into the muscle layer near your surgical site.  The medication may numb the location where you had surgery for 6-18 hours, but may last " up to 24 hours.  If your surgical site is an arm or leg you should be careful with your affected limb, since it is possible to injure your limb without being aware of it due to the numbing.  Until full feeling returns, you should guard against bumping or hitting your limb, and avoid extreme hot or cold temperatures on the skin.  As the block wears off, the feeling will return as a tingling or prickly sensation near your surgical site.  You will experience more discomfort from your incision as the feeling returns.  You may want to take a pain pill (a narcotic or Tylenol if this was prescribed by your surgeon) when you start to experience mild pain before the pain beccomes more severe.  If your pain medications do not control your pain you should notifiy your surgeon.    Tips for taking pain medications  To get the best pain relief possible, remember these points:    Take pain medications as directed, before pain becomes severe.    Pain medication can upset your stomach: taking it with food may help.    Constipation is a common side effect of pain medication. Drink plenty of  fluids.    Eat foods high in fiber. Take a stool softener if recommended by your doctor or pharmacist.    Do not drink alcohol, drive or operate machinery while taking pain medications.    Ask about other ways to control pain, such as with heat, ice or relaxation.    Tylenol/Acetaminophen Consumption  To help encourage the safe use of acetaminophen, the makers of TYLENOL  have lowered the maximum daily dose for single-ingredient Extra Strength TYLENOL  (acetaminophen) products sold in the [place] from 8 pills per day (4,000 mg) to 6 pills per day (3,000 mg). The dosing interval has also changed from 2 pills every 4-6 hours to 2 pills every 6 hours.    If you feel your pain relief is insufficient, you may take Tylenol/Acetaminophen in addition to your narcotic pain medication.     Be careful not to exceed 3,000 mg of Tylenol/Acetaminophen in a 24  hour period from all sources.    If you are taking extra strength Tylenol/acetaminophen (500 mg), the maximum dose is 6 tablets in 24 hours.    If you are taking regular strength acetaminophen (325 mg), the maximum dose is 9 tablets in 24 hours.    Call a doctor for any of the followin. Signs of infection (fever, growing tenderness at the surgery site, a large amount of drainage or bleeding, severe pain, foul-smelling drainage, redness, swelling).  2. It has been over 8 to 10 hours since surgery and you are still not able to urinate (pass water).  3. Headache for over 24 hours.  4. Signs of Covid-19 infection (temperature over 100 degrees, shortness of breath, cough, loss of taste/smell, generalized body aches, persistent headache, chills, sore throat, nausea/vomiting/diarrhea)  Your doctor is:       Dr. Simon Hoover, Orthopaedics: [phone]               Or dial [phone] and ask for the resident on call for:  Orthopaedics  For emergency care, call the:  [facility] Emergency Department: [phone] (TTY for hearing impaired: [phone])    POSTOPERATIVE INSTRUCTIONS FOOT AND ANKLE SURGERY   Simon Hoover M.D.    Crutches/walker: You must use crutches/walker when up until instructed otherwise.    Bloody drainage on the dressing cast: This is normal. Cast and dressing material act as a sponge.    Bruising of toes: It is normal to experience some bruising in the toes after surgery.    Driving: For surgery on your right foot/ankle, you may not drive unless otherwise instructed. For surgery on the left foot/ankle, driving is not advised for the first week.    Elevate foot/ankle: Keep your operated foot/ankle elevated at or above your heart 95% of the day the first 24-48 hours following surgery (only use the bathroom). Excessive swelling of the operative foot/ankle causes increased pain, problems with the incision healing and problems with the surgical repair healing.  o The foot should be elevated when you  sit to eat.  o Avoid sitting with your foot hanging down.  o You may lie on your side for periods during the day/night; however you should still keep       your foot elevated.  It is not advisable to lie on your stomach.    Ice:  Use ice on foot/ankle over dressing/cast for 2-3 days or more.    Lightheadedness when you get up:  To prevent lightheadedness, sit up and let your foot hang down for 3-5 minutes BEFORE you get up.    Can you move your toes? Only if you did not have surgery on your toes.  It is important to actively wiggle you toes at least 5-10 minutes each hour. This will help prevent blood clots.    Dressing/Cast: Keep clean and dry.    Bathing:  Take a sponge bath/tub instead of a shower.  If you choose to shower, cover the cast/dressing with a waterproof covering (heavy duty plastic bag, or purchased shower cover) and sit on a chair/stool. Have someone available to help you if needed.    Cam Walker:  If issued, wear the CAM walker (boot) 24 hours/day until your follow up appointment unless instructed otherwise.    Pain Medications:    o Take with food to avoid nausea related to the medication.  o Take the medication regularly as prescribed to avoid pain becoming difficult to manage.  Generally your pain should improve by the third day. It is not unusual for the pain to be worse a day or two after surgery than it is on the day of surgery.  o Do not take the pain medication more frequently than prescribed.  If the medication does not relieve the pain or does not last the prescribed time, contact your physician.  o As your pain lessens you may diminish your pain medicine intake by taking one pain pill with one plain Tylenol.  (Unless you have a medical condition restricting the use of Tylenol)  o Tylenol is an ingredient in some pain medications.  To avoid liver damage, it must be limited to 8 tablets (500 mg or Extra Strength) or 12 tablets (325 mg or regular strength) per day.  o Do not supplement your  pain medication with any type of anti-inflammatory medication (Ibuprofen, Motrin, Aleve, Celebrex, etc.) without first contacting your physician.  o If you need a refill, please call to allow 48-72 hours before you run out of medication, to avoid running out during evening, weekend or holiday hours.    Fever: It is not unusual to run a low grade fever after surgery.  If your temperature is elevated above 100 degrees, lasts for longer than 24 hours or is questionable in any way, contact the physician.    Constipation: all pain medications along with inactivity can cause constipation.  o Increase your fluid intake to AT LEAST 1 quart of water per day.  o Increase your dietary fiber (Bran cereals, whole grains, fruits, and vegetables.) Eat the  P  fruits: peaches, plums, pears, prunes. Anise/black licorice is also known to act as a natural laxative.  o Avoid the BRAT diet, (bananas, rice, applesauce, and toast) as it may increase constipation.  o If no bowel movement occurs 3 days following surgery, a mild over the counter laxative is recommended.    Be sure you have your post-operative follow up appointments made.  Call the [facility] Appointment Line:  [phone].

## 2021-10-13 NOTE — ANESTHESIA CARE TRANSFER NOTE
Patient: Angeline Aceves    Procedure: Procedure(s):  Hardware removal Right ankle       Diagnosis: Painful orthopaedic hardware (H) [T84.84XA]  Diagnosis Additional Information: No value filed.    Anesthesia Type:   General     Note:    Oropharynx: oropharynx clear of all foreign objects and spontaneously breathing  Level of Consciousness: awake  Oxygen Supplementation: room air    Independent Airway: airway patency satisfactory and stable  Dentition: dentition unchanged  Vital Signs Stable: post-procedure vital signs reviewed and stable  Report to RN Given: handoff report given  Patient transferred to: PACU    Handoff Report: Identifed the Patient, Identified the Reponsible Provider, Reviewed the pertinent medical history, Discussed the surgical course, Reviewed Intra-OP anesthesia mangement and issues during anesthesia, Set expectations for post-procedure period and Allowed opportunity for questions and acknowledgement of understanding      Vitals:  Vitals Value Taken Time   BP     Temp     Pulse     Resp     SpO2         Electronically Signed By: HEDY Peraza CRNA  October 13, 2021  7:31 AM

## 2021-10-13 NOTE — ANESTHESIA POSTPROCEDURE EVALUATION
Patient: Angeline Aceves    Procedure: Procedure(s):  Hardware removal Right ankle       Diagnosis:Painful orthopaedic hardware (H) [T84.84XA]  Diagnosis Additional Information: No value filed.    Anesthesia Type:  General    Note:  Disposition: Outpatient   Postop Pain Control: Uneventful            Sign Out: Well controlled pain   PONV: No   Neuro/Psych: Uneventful            Sign Out: Acceptable/Baseline neuro status   Airway/Respiratory: Uneventful            Sign Out: Acceptable/Baseline resp. status   CV/Hemodynamics: Uneventful            Sign Out: Acceptable CV status; No obvious hypovolemia; No obvious fluid overload   Other NRE: NONE   DID A NON-ROUTINE EVENT OCCUR? No           Last vitals:  Vitals Value Taken Time   /58 10/13/21 0745   Temp 36.1  C (97  F) 10/13/21 0745   Pulse 89 10/13/21 0745   Resp 16 10/13/21 0745   SpO2 97 % 10/13/21 0745       Electronically Signed By: DAWSON FALK MD  October 13, 2021  10:17 AM

## 2021-10-13 NOTE — BRIEF OP NOTE
[facility] [place]    Brief Operative Note    Pre-operative diagnosis: Painful orthopaedic hardware (H) [T84.84XA]  Post-operative diagnosis Same as pre-operative diagnosis    Procedure: Procedure(s):  Hardware removal Right ankle  Surgeon: Surgeon(s) and Role:     * Simon Hoover MD - Primary  Anesthesia: General   Estimated Blood Loss: 20 ml    Drains: None  Specimens: * No specimens in log *  Findings:   None.  Complications: None.  Implants:   Implant Name Type Inv. Item Serial No.  Lot No. LRB No. Used Action   IMP PLATE ZIM 1/3 TUBULAR [id number] 06H -860-03 Metallic Hardware/[place] IMP PLATE ZIM 1/3 TUBULAR [id number] 06H -771-03  [employer]  Right 1 Explanted       Plan:  Same Day surgery discharge to home once criteria met.  CAM boot to remain on right  lower extremity and WBAT.  No discharge meds needed.  No dressing change on own.  Leave dressing on until 2 weeks follow up appointment.  F/U in clinic in 2 weeks      Matthew Bingham MD

## 2021-10-13 NOTE — ANESTHESIA PREPROCEDURE EVALUATION
Anesthesia Pre-Procedure Evaluation    Patient: Angeline Aceves   MRN: [id number] : 1980        Preoperative Diagnosis: Retained R ankle hardware    Procedure : Hardware removal R ankle       Past Medical History:   Diagnosis Date     Closed fracture of right ankle with routine healing 2021     Headache 2014     Irritable bowel syndrome 2015     Unilateral deafness 2009    Left ear (Problem list name updated by automated process. Provider to review and confirm.)      Past Surgical History:   Procedure Laterality Date     C ANESTH,CLEFT PALATE REPAIR      10 surgeries 2 weeks-16 years of age     ENT SURGERY      Tubes as a child, exploratory on right in '     OPEN REDUCTION INTERNAL FIXATION ANKLE Right 2021    Procedure: Open reduction internal fixation Right ankle;  Surgeon: Simon Hoover MD;  [place] [facility] [place]      No Known Allergies   Social History     Tobacco Use     Smoking status: Never Smoker     Smokeless tobacco: Never Used   Substance Use Topics     Alcohol use: Yes     Alcohol/week: 1.7 - 2.5 standard drinks     Comment: 3-4 drinks per week      Wt Readings from Last 1 Encounters:   10/13/21 79.4 kg (175 lb)        Anesthesia Evaluation   Pt has had prior anesthetic. Type: General and MAC.    No history of anesthetic complications       ROS/MED HX  ENT/Pulmonary: Comment: Mandible surgery as a teenager with subsequent ear issues leading to loss of hearing in left ear.  Right ear hearing normal.     S/p surgical repair of cleft lip and palate as a child.  Denies ongoing issues.      (+) FERNANDEZ risk factors, obese, allergic rhinitis,  (-) tobacco use   Neurologic:  - neg neurologic ROS     Cardiovascular:  - neg cardiovascular ROS     METS/Exercise Tolerance: >4 METS Comment: Exercises by walking 15-20 minutes.    Hematologic:  - neg hematologic  ROS     Musculoskeletal:  - neg musculoskeletal ROS     GI/Hepatic: Comment: IBS fluctuates b/w diarrhea and  constipation - neg GI/hepatic ROS     Renal/Genitourinary:  - neg Renal ROS     Endo:     (+) Obesity,     Psychiatric/Substance Use:  - neg psychiatric ROS     Infectious Disease: Comment: Covid vaccine completed.       Malignancy:  - neg malignancy ROS     Other:      (+) LMP: 9/11/2021, ,         Physical Exam    Airway        Mallampati: II   TM distance: > 3 FB   Neck ROM: full   Mouth opening: > 3 cm    Respiratory Devices and Support         Dental  no notable dental history         Cardiovascular   cardiovascular exam normal          Pulmonary   pulmonary exam normal                OUTSIDE LABS:  CBC:   Lab Results   Component Value Date    HGB 12.1 02/01/2021    HGB 12.2 10/02/2019     BMP:   Lab Results   Component Value Date    GLC 84 10/02/2019    GLC 83 07/11/2017     COAGS: No results found for: PTT, INR, FIBR  POC:   Lab Results   Component Value Date    HCG Negative 02/02/2021     HEPATIC: No results found for: ALBUMIN, PROTTOTAL, ALT, AST, GGT, ALKPHOS, BILITOTAL, BILIDIRECT, DENICE  OTHER: No results found for: PH, LACT, A1C, PALMIRA, PHOS, MAG, LIPASE, AMYLASE, TSH, T4, T3, CRP, SED    Anesthesia Plan    ASA Status:  2   NPO Status:  NPO Appropriate    Anesthesia Type: General.     - Airway: LMA   Induction: Intravenous.   Maintenance: Balanced.        Consents    Anesthesia Plan(s) and associated risks, benefits, and realistic alternatives discussed. Questions answered and patient/representative(s) expressed understanding.     - Discussed with:  Patient, Parent (Mother and/or Father)         Postoperative Care    Pain management: Multi-modal analgesia, IV analgesics, Oral pain medications.   PONV prophylaxis: Background Propofol Infusion, Ondansetron (or other 5HT-3), Dexamethasone or Solumedrol     Comments:                PAC Discussion and Assessment    ASA Classification: 1  Case is suitable for: ASC                    PAC Resident/NP Anesthesia Assessment: Angeline Aceves is a 41-year-old female  scheduled for Hardware removal Right ankle - Right with Simon Hoover MD on 10/13/2021 at [facility] under choice anesthesia.     Ms. Aceves suffered a right ankle lateral malleolus fracture and is status post right ankle open reduction and internal fixation with Dr. Hoover performed on 02/02/2021.  Since this surgery, Ms. Aceves has been having some irritation from the hardware.  She followed up with Dr. Hoover via telephone visit on 9/7/2021 and they discussed hardware removal from the right ankle.  Dr. Hoover counseled her on the procedure.  Ms. Aceves presents to PAC virtually today in preparation for the above procedure.     Diagnosis  Painful orthopaedic hardware (H)      Painful orthopaedic hardware (H)   The patient's records and results personally reviewed by this provider.     Outside records reviewed from: care everywhere    ASSESSMENT and PLAN    Specific operative considerations:   - FERNANDEZ # of risks 1/8 = low  - VTE risk:  0.26%  - Risk of PONV score = 2.  If > 2, anti-emetic intervention recommended.      #  Cardiology   - Denies known coronary artery disease.  Denies cardiac symptoms. METS:  >4. Low risk procedure. RCRI : No serious cardiac risks.  0.4 % risk of major adverse cardiac event.         #  Pulmonary   - no smoking hx  - Denies pulmonary symptoms  - No inhalers     #  Hematology   - Denies hx of bleeding or clotting disorder  - Denies h/o blood transfusion  - Denies taking blood thinners     #  Endocrine     -  Obesity, BMI >30.0     #  Musculoskeletal   - right ankle lateral malleolus fracture s/p right ankle open reduction and internal fixation with Dr. Hoover performed on 02/02/202. Now with painful hardware. Above procedure planned.      # GI  - IBS, stable    #  HEENT  - Seasonal allergies, currently stable and not taking any medication  - Multiple ENT procedures in the past with cleft lip and palate repair as a child.  As teenager, mandible surgery complicated by left  ear deafness. Right ear with appropriate hearing.  Denies any adverse issues with anesthesia.        #  ID  - COVID-19 testing per surgeon's office  - Completed COVID vaccine     #   Anesthesia considerations   -  Refer to PAC assessment in anesthesia records      Arrival time, NPO, shower and medication instructions provided by nursing staff today.    Patient is an acceptable candidate for the proposed procedure.  No further diagnostic evaluation is needed.     **For further details of assessment, testing, and physical exam please see H and P completed on same date.      TYPE OF VISIT  Type of service:  Video Visit    Patient verbally consented to video service today: YES    Two identifiers used:  yes (name and )    Video Start Time: 12:27  Video End Time (time video stopped): 12:40    Originating Location (pt. Location): Home    Distant Location (provider location):  home    Mode of Communication:  Video Conference via Qoopl    Please note, because this was a virtual visit, a full physical could not be completed.  On the DOS, the OOD of anesthesia will complete the appropriate components of the physical exam.       Reviewed and Signed by PAC Mid-Level Provider/Resident  Mid-Level Provider/Resident: Delmy Lam APRN  Date: 10/4/2021                                 DAWSON FALK MD

## 2021-10-25 NOTE — PROGRESS NOTES
Reason for visit:    Angeline Aceves came in to the clinic for a two week post op check.    Her surgery was done 10/13/2021 by Dr Hoover.  She had right ankle hardware removal     Assessment:    Angeline came into the clinic in a regular shoe WBAT    The Surgical wounds were exposed and found to be well-healed and without evidence of infection; so the sutures were removed. CMS was found to be intact.     Plan:     Steri strips were placed and She was placed in a regular shoe.  She was told to remain Non-WB     She can follow up with Dr. Hoover on an as needed basis.     She has our phone number and will call with questions or problems.        Answers for HPI/ROS submitted by the patient on 10/24/2021  General Symptoms: No  Skin Symptoms: No  HENT Symptoms: No  EYE SYMPTOMS: No  HEART SYMPTOMS: No  LUNG SYMPTOMS: No  INTESTINAL SYMPTOMS: No  URINARY SYMPTOMS: No  GYNECOLOGIC SYMPTOMS: No  BREAST SYMPTOMS: No  SKELETAL SYMPTOMS: No  BLOOD SYMPTOMS: No  NERVOUS SYSTEM SYMPTOMS: No  MENTAL HEALTH SYMPTOMS: No

## 2021-10-27 ENCOUNTER — OFFICE VISIT (OUTPATIENT)
Dept: ORTHOPEDICS | Facility: CLINIC | Age: 41
End: 2021-10-27
Payer: COMMERCIAL

## 2021-10-27 DIAGNOSIS — Z98.890 S/P HARDWARE REMOVAL: Primary | ICD-10-CM

## 2021-10-27 PROCEDURE — 99024 POSTOP FOLLOW-UP VISIT: CPT

## 2021-11-05 ENCOUNTER — TELEPHONE (OUTPATIENT)
Dept: ORTHOPEDICS | Facility: CLINIC | Age: 41
End: 2021-11-05

## 2021-11-05 NOTE — TELEPHONE ENCOUNTER
Writer called and explained on the phone that the hardware that Dr. Hoover removed is ready for . Pt will pick it up when in clinic next.     Della Sykes LPN

## 2022-02-27 ENCOUNTER — HEALTH MAINTENANCE LETTER (OUTPATIENT)
Age: 42
End: 2022-02-27

## 2022-04-13 ASSESSMENT — ENCOUNTER SYMPTOMS
PARESTHESIAS: 0
HEMATOCHEZIA: 0
WEAKNESS: 0
HEARTBURN: 0
DIZZINESS: 0
CONSTIPATION: 0
NAUSEA: 0
FREQUENCY: 0
BREAST MASS: 0
SHORTNESS OF BREATH: 0
NERVOUS/ANXIOUS: 0
DIARRHEA: 0
EYE PAIN: 0
FEVER: 0
HEMATURIA: 0
ABDOMINAL PAIN: 0
JOINT SWELLING: 0
PALPITATIONS: 0
COUGH: 0
CHILLS: 0
DYSURIA: 0
MYALGIAS: 0
SORE THROAT: 0
HEADACHES: 0
ARTHRALGIAS: 0

## 2022-04-14 ENCOUNTER — OFFICE VISIT (OUTPATIENT)
Dept: FAMILY MEDICINE | Facility: CLINIC | Age: 42
End: 2022-04-14
Payer: COMMERCIAL

## 2022-04-14 VITALS
SYSTOLIC BLOOD PRESSURE: 110 MMHG | HEIGHT: 62 IN | DIASTOLIC BLOOD PRESSURE: 77 MMHG | HEART RATE: 76 BPM | OXYGEN SATURATION: 98 % | WEIGHT: 184 LBS | RESPIRATION RATE: 16 BRPM | BODY MASS INDEX: 33.86 KG/M2 | TEMPERATURE: 97.8 F

## 2022-04-14 DIAGNOSIS — Z12.31 ENCOUNTER FOR SCREENING MAMMOGRAM FOR BREAST CANCER: ICD-10-CM

## 2022-04-14 DIAGNOSIS — Z13.1 SCREENING FOR DIABETES MELLITUS: ICD-10-CM

## 2022-04-14 DIAGNOSIS — R73.9 ELEVATED BLOOD SUGAR: ICD-10-CM

## 2022-04-14 DIAGNOSIS — Z00.00 ROUTINE GENERAL MEDICAL EXAMINATION AT A HEALTH CARE FACILITY: Primary | ICD-10-CM

## 2022-04-14 DIAGNOSIS — Z13.220 LIPID SCREENING: ICD-10-CM

## 2022-04-14 LAB
CHOLEST SERPL-MCNC: 192 MG/DL
FASTING STATUS PATIENT QL REPORTED: YES
FASTING STATUS PATIENT QL REPORTED: YES
GLUCOSE BLD-MCNC: 103 MG/DL (ref 70–99)
HDLC SERPL-MCNC: 41 MG/DL
LDLC SERPL CALC-MCNC: 115 MG/DL
NONHDLC SERPL-MCNC: 151 MG/DL
TRIGL SERPL-MCNC: 180 MG/DL

## 2022-04-14 PROCEDURE — 80061 LIPID PANEL: CPT | Performed by: FAMILY MEDICINE

## 2022-04-14 PROCEDURE — 36415 COLL VENOUS BLD VENIPUNCTURE: CPT | Performed by: FAMILY MEDICINE

## 2022-04-14 PROCEDURE — 99396 PREV VISIT EST AGE 40-64: CPT | Performed by: FAMILY MEDICINE

## 2022-04-14 PROCEDURE — 82947 ASSAY GLUCOSE BLOOD QUANT: CPT | Performed by: FAMILY MEDICINE

## 2022-04-14 ASSESSMENT — ENCOUNTER SYMPTOMS
PARESTHESIAS: 0
DIZZINESS: 0
HEMATOCHEZIA: 0
MYALGIAS: 0
HEMATURIA: 0
ARTHRALGIAS: 0
CONSTIPATION: 0
DIARRHEA: 0
BREAST MASS: 0
NAUSEA: 0
ABDOMINAL PAIN: 0
EYE PAIN: 0
HEARTBURN: 0
COUGH: 0
FREQUENCY: 0
WEAKNESS: 0
SHORTNESS OF BREATH: 0
NERVOUS/ANXIOUS: 0
PALPITATIONS: 0
DYSURIA: 0
HEADACHES: 0
FEVER: 0
JOINT SWELLING: 0
SORE THROAT: 0
CHILLS: 0

## 2022-04-14 NOTE — PROGRESS NOTES
SUBJECTIVE:   CC: Angeline Aceves is an 41 year old woman who presents for preventive health visit.       Patient has been advised of split billing requirements and indicates understanding: Yes  Healthy Habits:     Getting at least 3 servings of Calcium per day:  Yes    Bi-annual eye exam:  Yes    Dental care twice a year:  Yes    Sleep apnea or symptoms of sleep apnea:  None    Diet:  Regular (no restrictions)    Frequency of exercise:  2-3 days/week    Duration of exercise:  15-30 minutes    Taking medications regularly:  Yes    Medication side effects:  None    PHQ-2 Total Score: 0    Additional concerns today:  No      Today's PHQ-2 Score:   PHQ-2 ( 1999 [employer]) 4/13/2022   Q1: Little interest or pleasure in doing things 0   Q2: Feeling down, depressed or hopeless 0   PHQ-2 Score 0   PHQ-2 Total Score (12-17 Years)- Positive if 3 or more points; Administer PHQ-A if positive -   Q1: Little interest or pleasure in doing things Not at all   Q2: Feeling down, depressed or hopeless Not at all   PHQ-2 Score 0       Abuse: Current or Past (Physical, Sexual or Emotional) - No  Do you feel safe in your environment? Yes    Have you ever done Advance Care Planning? (For example, a Health Directive, POLST, or a discussion with a medical provider or your loved ones about your wishes): No    Social History     Tobacco Use     Smoking status: Never Smoker     Smokeless tobacco: Never Used   Substance Use Topics     Alcohol use: Yes     Comment: 3-4 drinks per week         Alcohol Use 4/13/2022   Prescreen: >3 drinks/day or >7 drinks/week? No   Prescreen: >3 drinks/day or >7 drinks/week? -       Reviewed orders with patient.  Reviewed health maintenance and updated orders accordingly - Yes      Breast Cancer Screening:    Breast CA Risk Assessment (FHS-7) 4/13/2022   Do you have a family history of breast, colon, or ovarian cancer? No / Unknown         Pertinent mammograms are reviewed under the imaging tab.    History of  "abnormal Pap smear: NO - age 30-65 PAP every 5 years with negative HPV co-testing recommended  PAP / HPV Latest Ref Rng & Units 10/2/2019 9/25/2015 1/18/2013   PAP (Historical) - NIL NIL NIL   HPV16 NEG:Negative Negative - -   HPV18 NEG:Negative Negative - -   HRHPV NEG:Negative Negative - -     Reviewed and updated as needed this visit by clinical staff   Tobacco  Allergies  Meds     Fam Hx          Reviewed and updated as needed this visit by Provider                  Review of Systems   Constitutional: Negative for chills and fever.   HENT: Negative for congestion, ear pain, hearing loss and sore throat.    Eyes: Negative for pain and visual disturbance.   Respiratory: Negative for cough and shortness of breath.    Cardiovascular: Negative for chest pain, palpitations and peripheral edema.   Gastrointestinal: Negative for abdominal pain, constipation, diarrhea, heartburn, hematochezia and nausea.   Breasts:  Negative for tenderness, breast mass and discharge.   Genitourinary: Negative for dysuria, frequency, genital sores, hematuria, pelvic pain, urgency, vaginal bleeding and vaginal discharge.   Musculoskeletal: Negative for arthralgias, joint swelling and myalgias.   Skin: Negative for rash.   Neurological: Negative for dizziness, weakness, headaches and paresthesias.   Psychiatric/Behavioral: Negative for mood changes. The patient is not nervous/anxious.         OBJECTIVE:   /77   Pulse 76   Temp 97.8  F (36.6  C) (Oral)   Resp 16   Ht 1.562 m (5' 1.5\")   Wt 83.5 kg (184 lb)   LMP 01/28/2022 (Exact Date)   SpO2 98%   BMI 34.20 kg/m    Physical Exam  GENERAL: healthy, alert and no distress  EYES: Eyes grossly normal to inspection,  conjunctivae and sclerae normal  HENT: ear canals and TM's normal  NECK: no adenopathy, no asymmetry, masses, or scars and thyroid normal to palpation  RESP: lungs clear to auscultation - no rales, rhonchi or wheezes  CV: regular rate and rhythm, normal S1 " "S2  ABDOMEN: soft, nontender, no hepatosplenomegaly, no masses and bowel sounds normal  MS: no gross musculoskeletal defects noted, no edema  SKIN: no suspicious lesions or rashes  NEURO: Normal strength and tone, mentation intact and speech normal  PSYCH: mentation appears normal, affect normal/bright    Diagnostic Test Results:  none     ASSESSMENT/PLAN:     1. Routine general medical examination at a health care facility  - up to date with immunizations  - pap due 2024  - hemoglobin routinely monitored as Angeline donates blood    2. Encounter for screening mammogram for breast cancer  - MA Screening Digital Bilateral; Future    3. Lipid screening  - Lipid Profile (Chol, Trig, HDL, LDL calc); Future  - Lipid Profile (Chol, Trig, HDL, LDL calc)    4. Screening for diabetes mellitus  - Glucose; Future  - Glucose      Patient has been advised of split billing requirements and indicates understanding: Yes    COUNSELING:  Reviewed preventive health counseling, as reflected in patient instructions    Estimated body mass index is 34.2 kg/m  as calculated from the following:    Height as of this encounter: 1.562 m (5' 1.5\").    Weight as of this encounter: 83.5 kg (184 lb).        She reports that she has never smoked. She has never used smokeless tobacco.      Counseling Resources:  ATP IV Guidelines  Pooled Cohorts Equation Calculator  Breast Cancer Risk Calculator  BRCA-Related Cancer Risk Assessment: FHS-7 Tool  FRAX Risk Assessment  ICSI Preventive Guidelines  Dietary Guidelines for Americans, 2010  USDA's MyPlate  ASA Prophylaxis  Lung CA Screening    Cyndee Daley MD  [facility]  "

## 2022-05-17 ENCOUNTER — ANCILLARY PROCEDURE (OUTPATIENT)
Dept: MAMMOGRAPHY | Facility: CLINIC | Age: 42
End: 2022-05-17
Attending: FAMILY MEDICINE
Payer: COMMERCIAL

## 2022-05-17 DIAGNOSIS — Z12.31 ENCOUNTER FOR SCREENING MAMMOGRAM FOR BREAST CANCER: ICD-10-CM

## 2022-05-17 PROCEDURE — 77067 SCR MAMMO BI INCL CAD: CPT | Mod: GC

## 2022-09-08 NOTE — OP NOTE
Procedure Date: 10/13/2021    PREOPERATIVE DIAGNOSIS:  Right painful retained hardware.    POSTOPERATIVE DIAGNOSIS:  Right painful retained hardware.    PROCEDURE:  Right ankle hardware removal.    SURGEON:  Simon Hoover MD    ASSISTANT:  Matthew Montemayor PGY-1 Orthopedic Resident.    COMPLICATIONS:  None.    DRAINS:  None.    ESTIMATED BLOOD LOSS:  Less than 20 mL    ANESTHESIA:  General endotracheal.    INDICATIONS FOR PROCEDURE:  Please refer to clinic note for further details regarding indications to Ms. Aceves's case.    DESCRIPTION OF PROCEDURE:  On 10/13/2021, patient was taken to surgery.  Preoperative antibiotics were administered to the patient prior to arrival to the OR.    After successful induction of general endotracheal anesthesia, she was placed supine on the operating table.  The right lower extremity was prepped and draped in sterile fashion.  After exsanguination by gravity, and tourniquet cuff was inflated to 300 mmHg on the proximal third of the right thigh.    The pause for the cause was performed according to institution's policy which confirmed laterality of the procedure.    Through the previous surgical incision, we proceeded indication of the hardware, which consisted of 5 screws and a plate.  All hardware was removed in 1 piece without any difficulties.  Special attention was placed to remove some of the scar tissue along the lateral cortex of the fibula.    Tourniquet was deflated.  Satisfactory hemostasis was accomplished.  The wound was closed in layers.  Sterile dressings were applied.  The patient was placed in a tall Cam walker and transferred in stable condition to PACU.    PLAN:  The patient will remain weightbearing as tolerated with use of CAM Walker.  CAM walker will be utilized at all times except for hygiene for the first 2 weeks from surgery.  At that time, sutures will be removed if indicated and she will proceed with the use of the CAM Walker for comfort purposes.   The patient will have no activity restrictions.  The patient is not expected to require any physical therapy; however, if that is her desire that will be performed without any restrictions.    The patient will be granted the possibility for skipping the 6-week appointment, assuming the ankle is working up to her expectations.    In the eventuality of her return to the 6-week appointment, no x-rays will be obtained.    Simon Hoover MD        D: 10/14/2021   T: 10/14/2021   MT: SOILA    Name:     LUCY MASONDebra  MRN:      [id number]        Account:        [id number]   :      1980           Procedure Date: 10/13/2021     Document: [id number]   59

## 2023-04-30 ASSESSMENT — ENCOUNTER SYMPTOMS
DYSURIA: 0
HEMATOCHEZIA: 0
SHORTNESS OF BREATH: 0
PALPITATIONS: 0
HEARTBURN: 0
EYE PAIN: 0
NAUSEA: 0
ARTHRALGIAS: 0
HEMATURIA: 0
HEADACHES: 0
DIZZINESS: 0
BREAST MASS: 0
MYALGIAS: 0
FEVER: 0
WEAKNESS: 0
ABDOMINAL PAIN: 0
COUGH: 0
NERVOUS/ANXIOUS: 0
CHILLS: 0
FREQUENCY: 0
DIARRHEA: 0
JOINT SWELLING: 0
SORE THROAT: 0
CONSTIPATION: 0
PARESTHESIAS: 0

## 2023-05-01 ENCOUNTER — OFFICE VISIT (OUTPATIENT)
Dept: FAMILY MEDICINE | Facility: CLINIC | Age: 43
End: 2023-05-01
Payer: COMMERCIAL

## 2023-05-01 VITALS
OXYGEN SATURATION: 100 % | RESPIRATION RATE: 14 BRPM | HEIGHT: 62 IN | HEART RATE: 74 BPM | SYSTOLIC BLOOD PRESSURE: 109 MMHG | WEIGHT: 181 LBS | DIASTOLIC BLOOD PRESSURE: 76 MMHG | TEMPERATURE: 97.9 F | BODY MASS INDEX: 33.31 KG/M2

## 2023-05-01 DIAGNOSIS — R73.01 ELEVATED FASTING BLOOD SUGAR: ICD-10-CM

## 2023-05-01 DIAGNOSIS — Z00.00 ROUTINE GENERAL MEDICAL EXAMINATION AT A HEALTH CARE FACILITY: Primary | ICD-10-CM

## 2023-05-01 DIAGNOSIS — Z13.220 LIPID SCREENING: ICD-10-CM

## 2023-05-01 DIAGNOSIS — Z11.59 NEED FOR HEPATITIS C SCREENING TEST: ICD-10-CM

## 2023-05-01 DIAGNOSIS — Z11.4 SCREENING FOR HIV (HUMAN IMMUNODEFICIENCY VIRUS): ICD-10-CM

## 2023-05-01 DIAGNOSIS — Z12.31 ENCOUNTER FOR SCREENING MAMMOGRAM FOR BREAST CANCER: ICD-10-CM

## 2023-05-01 DIAGNOSIS — Z11.59 NEED FOR HEPATITIS B SCREENING TEST: ICD-10-CM

## 2023-05-01 LAB
CHOLEST SERPL-MCNC: 190 MG/DL
HBA1C MFR BLD: 5.3 % (ref 0–5.6)
HBV CORE AB SERPL QL IA: NONREACTIVE
HBV SURFACE AB SERPL IA-ACNC: 164.98 M[IU]/ML
HBV SURFACE AB SERPL IA-ACNC: REACTIVE M[IU]/ML
HBV SURFACE AG SERPL QL IA: NONREACTIVE
HDLC SERPL-MCNC: 55 MG/DL
LDLC SERPL CALC-MCNC: 112 MG/DL
NONHDLC SERPL-MCNC: 135 MG/DL
TRIGL SERPL-MCNC: 116 MG/DL

## 2023-05-01 PROCEDURE — 86706 HEP B SURFACE ANTIBODY: CPT | Performed by: FAMILY MEDICINE

## 2023-05-01 PROCEDURE — 83036 HEMOGLOBIN GLYCOSYLATED A1C: CPT | Performed by: FAMILY MEDICINE

## 2023-05-01 PROCEDURE — 36415 COLL VENOUS BLD VENIPUNCTURE: CPT | Performed by: FAMILY MEDICINE

## 2023-05-01 PROCEDURE — 87340 HEPATITIS B SURFACE AG IA: CPT | Performed by: FAMILY MEDICINE

## 2023-05-01 PROCEDURE — 86704 HEP B CORE ANTIBODY TOTAL: CPT | Performed by: FAMILY MEDICINE

## 2023-05-01 PROCEDURE — 80061 LIPID PANEL: CPT | Performed by: FAMILY MEDICINE

## 2023-05-01 PROCEDURE — 99396 PREV VISIT EST AGE 40-64: CPT | Performed by: FAMILY MEDICINE

## 2023-05-01 ASSESSMENT — ENCOUNTER SYMPTOMS
SHORTNESS OF BREATH: 0
ABDOMINAL PAIN: 0
DIARRHEA: 0
PALPITATIONS: 0
NERVOUS/ANXIOUS: 0
BREAST MASS: 0
WEAKNESS: 0
CHILLS: 0
JOINT SWELLING: 0
DIZZINESS: 0
HEARTBURN: 0
ARTHRALGIAS: 0
HEMATURIA: 0
EYE PAIN: 0
FREQUENCY: 0
COUGH: 0
NAUSEA: 0
HEMATOCHEZIA: 0
PARESTHESIAS: 0
DYSURIA: 0
CONSTIPATION: 0
FEVER: 0
MYALGIAS: 0
SORE THROAT: 0
HEADACHES: 0

## 2023-05-01 ASSESSMENT — PAIN SCALES - GENERAL: PAINLEVEL: NO PAIN (0)

## 2023-05-01 NOTE — PROGRESS NOTES
SUBJECTIVE:   CC: Angeline is an 42 year old who presents for preventive health visit.       2023     7:22 AM   Additional Questions   Roomed by rm murray   Accompanied by none         2023     7:22 AM   Patient Reported Additional Medications   Patient reports taking the following new medications none   Patient has been advised of split billing requirements and indicates understanding: Yes  Healthy Habits:     Getting at least 3 servings of Calcium per day:  Yes    Bi-annual eye exam:  Yes    Dental care twice a year:  Yes    Sleep apnea or symptoms of sleep apnea:  None    Diet:  Regular (no restrictions)    Frequency of exercise:  4-5 days/week    Duration of exercise:  15-30 minutes    Taking medications regularly:  Yes    Medication side effects:  None    PHQ-2 Total Score: 0    Additional concerns today:  No      Today's PHQ-2 Score:       2023     9:04 AM   PHQ-2 (  [employer])   Q1: Little interest or pleasure in doing things 0   Q2: Feeling down, depressed or hopeless 0   PHQ-2 Score 0   Q1: Little interest or pleasure in doing things Not at all   Q2: Feeling down, depressed or hopeless Not at all   PHQ-2 Score 0       Have you ever done Advance Care Planning? (For example, a Health Directive, POLST, or a discussion with a medical provider or your loved ones about your wishes):not discussed     Social History     Tobacco Use     Smoking status: Never     Passive exposure: Never     Smokeless tobacco: Never   Vaping Use     Vaping status: Never Used   Substance Use Topics     Alcohol use: Yes     Comment: 3-4 drinks per week             2023     9:03 AM   Alcohol Use   Prescreen: >3 drinks/day or >7 drinks/week? No     Reviewed orders with patient.  Reviewed health maintenance and updated orders accordingly - Yes      Breast Cancer Screenin/13/2022     9:55 PM   Breast CA Risk Assessment (FHS-7)   Do you have a family history of breast, colon, or ovarian cancer? No / Unknown  "        Pertinent mammograms are reviewed under the imaging tab.    History of abnormal Pap smear: NO - age 30-65 PAP every 5 years with negative HPV co-testing recommended      Latest Ref Rng & Units 10/2/2019     2:05 PM 10/2/2019     1:59 PM 9/25/2015    12:00 AM   PAP / HPV   PAP (Historical)   NIL   NIL     HPV 16 DNA NEG^Negative Negative       HPV 18 DNA NEG^Negative Negative       Other HR HPV NEG^Negative Negative         Reviewed and updated as needed this visit by clinical staff   Tobacco  Allergies  Meds              Reviewed and updated as needed this visit by Provider                     Review of Systems   Constitutional: Negative for chills and fever.   HENT: Negative for congestion, ear pain, hearing loss and sore throat.    Eyes: Negative for pain and visual disturbance.   Respiratory: Negative for cough and shortness of breath.    Cardiovascular: Negative for chest pain, palpitations and peripheral edema.   Gastrointestinal: Negative for abdominal pain, constipation, diarrhea, heartburn, hematochezia and nausea.   Breasts:  Negative for tenderness, breast mass and discharge.   Genitourinary: Negative for dysuria, frequency, genital sores, hematuria, pelvic pain, urgency, vaginal bleeding and vaginal discharge.   Musculoskeletal: Negative for arthralgias, joint swelling and myalgias.   Skin: Negative for rash.   Neurological: Negative for dizziness, weakness, headaches and paresthesias.   Psychiatric/Behavioral: Negative for mood changes. The patient is not nervous/anxious.        OBJECTIVE:   /76 (BP Location: Right arm, Patient Position: Chair, Cuff Size: Adult Regular)   Pulse 74   Temp 97.9  F (36.6  C) (Temporal)   Resp 14   Ht 1.575 m (5' 2\")   Wt 82.1 kg (181 lb)   LMP 04/05/2023 (Exact Date)   SpO2 100%   BMI 33.11 kg/m    Physical Exam  GENERAL: healthy, alert and no distress  EYES: Eyes grossly normal to inspection,  conjunctivae and sclerae normal  HENT: ear canals and " "TM's normal  NECK: no adenopathy, no asymmetry, masses, or scars and thyroid normal to palpation  RESP: lungs clear to auscultation - no rales, rhonchi or wheezes  BREAST: normal without masses, tenderness or nipple discharge and no palpable axillary masses or adenopathy  CV: regular rate and rhythm, normal S1 S2  ABDOMEN: soft, nontender, no hepatosplenomegaly, no masses and bowel sounds normal  MS: no gross musculoskeletal defects noted, no edema  SKIN: no suspicious lesions or rashes  NEURO: Normal strength and tone, mentation intact and speech normal  PSYCH: mentation appears normal, affect normal    Diagnostic Test Results:  Labs reviewed in Epic    ASSESSMENT/PLAN:     Routine general medical examination at a health care facility    Need for hepatitis C screening test  Screening for HIV (human immunodeficiency virus)  - declined     Elevated fasting blood sugar  - Lipid panel reflex to direct LDL Fasting; Future  - Lipid panel reflex to direct LDL Fasting    Lipid screening  - Hemoglobin A1c; Future  - Hemoglobin A1c    Encounter for screening mammogram for breast cancer  - MA Screen Bilateral w/Robin; Future    Need for hepatitis B screening test  - Hepatitis B core antibody; Future  - Hepatitis B Surface Antibody; Future  - Hepatitis B surface antigen; Future  - Hepatitis B core antibody  - Hepatitis B Surface Antibody  - Hepatitis B surface antigen    Patient has been advised of split billing requirements and indicates understanding: Yes      COUNSELING:  Reviewed preventive health counseling, as reflected in patient instructions      BMI:   Estimated body mass index is 33.11 kg/m  as calculated from the following:    Height as of this encounter: 1.575 m (5' 2\").    Weight as of this encounter: 82.1 kg (181 lb).         She reports that she has never smoked. She has never been exposed to tobacco smoke. She has never used smokeless tobacco.          Cyndee Daley MD  [facility] " [place]

## 2023-06-01 ENCOUNTER — ANCILLARY PROCEDURE (OUTPATIENT)
Dept: MAMMOGRAPHY | Facility: CLINIC | Age: 43
End: 2023-06-01
Attending: FAMILY MEDICINE
Payer: COMMERCIAL

## 2023-06-01 DIAGNOSIS — Z12.31 ENCOUNTER FOR SCREENING MAMMOGRAM FOR BREAST CANCER: ICD-10-CM

## 2023-06-01 PROCEDURE — 77067 SCR MAMMO BI INCL CAD: CPT | Performed by: RADIOLOGY

## 2023-06-01 PROCEDURE — 77063 BREAST TOMOSYNTHESIS BI: CPT | Performed by: RADIOLOGY

## 2023-06-08 ENCOUNTER — ANCILLARY PROCEDURE (OUTPATIENT)
Dept: MAMMOGRAPHY | Facility: CLINIC | Age: 43
End: 2023-06-08
Attending: FAMILY MEDICINE
Payer: COMMERCIAL

## 2023-06-08 DIAGNOSIS — R92.8 ABNORMAL MAMMOGRAM OF LEFT BREAST: ICD-10-CM

## 2023-06-08 PROCEDURE — 76642 ULTRASOUND BREAST LIMITED: CPT | Mod: LT | Performed by: RADIOLOGY

## 2023-06-08 PROCEDURE — G0279 TOMOSYNTHESIS, MAMMO: HCPCS | Mod: LT | Performed by: RADIOLOGY

## 2023-06-08 PROCEDURE — 77065 DX MAMMO INCL CAD UNI: CPT | Mod: LT | Performed by: RADIOLOGY

## 2024-04-01 ENCOUNTER — PATIENT OUTREACH (OUTPATIENT)
Dept: CARE COORDINATION | Facility: CLINIC | Age: 44
End: 2024-04-01
Payer: COMMERCIAL

## 2024-06-13 ENCOUNTER — ANCILLARY PROCEDURE (OUTPATIENT)
Dept: MAMMOGRAPHY | Facility: CLINIC | Age: 44
End: 2024-06-13
Attending: FAMILY MEDICINE
Payer: COMMERCIAL

## 2024-06-13 DIAGNOSIS — Z12.31 VISIT FOR SCREENING MAMMOGRAM: ICD-10-CM

## 2024-06-13 PROCEDURE — 77063 BREAST TOMOSYNTHESIS BI: CPT | Mod: GC | Performed by: STUDENT IN AN ORGANIZED HEALTH CARE EDUCATION/TRAINING PROGRAM

## 2024-06-13 PROCEDURE — 77067 SCR MAMMO BI INCL CAD: CPT | Mod: GC | Performed by: STUDENT IN AN ORGANIZED HEALTH CARE EDUCATION/TRAINING PROGRAM

## 2024-06-15 ENCOUNTER — HEALTH MAINTENANCE LETTER (OUTPATIENT)
Age: 44
End: 2024-06-15

## 2024-07-15 SDOH — HEALTH STABILITY: PHYSICAL HEALTH: ON AVERAGE, HOW MANY MINUTES DO YOU ENGAGE IN EXERCISE AT THIS LEVEL?: 40 MIN

## 2024-07-15 SDOH — HEALTH STABILITY: PHYSICAL HEALTH: ON AVERAGE, HOW MANY DAYS PER WEEK DO YOU ENGAGE IN MODERATE TO STRENUOUS EXERCISE (LIKE A BRISK WALK)?: 4 DAYS

## 2024-07-15 ASSESSMENT — SOCIAL DETERMINANTS OF HEALTH (SDOH): HOW OFTEN DO YOU GET TOGETHER WITH FRIENDS OR RELATIVES?: THREE TIMES A WEEK

## 2024-07-18 ENCOUNTER — OFFICE VISIT (OUTPATIENT)
Dept: FAMILY MEDICINE | Facility: CLINIC | Age: 44
End: 2024-07-18
Payer: COMMERCIAL

## 2024-07-18 VITALS
HEIGHT: 61 IN | DIASTOLIC BLOOD PRESSURE: 87 MMHG | OXYGEN SATURATION: 98 % | BODY MASS INDEX: 35.33 KG/M2 | WEIGHT: 187.1 LBS | RESPIRATION RATE: 18 BRPM | HEART RATE: 74 BPM | TEMPERATURE: 97.2 F | SYSTOLIC BLOOD PRESSURE: 132 MMHG

## 2024-07-18 DIAGNOSIS — Z13.1 SCREENING FOR DIABETES MELLITUS: ICD-10-CM

## 2024-07-18 DIAGNOSIS — Z13.220 LIPID SCREENING: ICD-10-CM

## 2024-07-18 DIAGNOSIS — Z78.9 HEPATITIS B IMMUNE: ICD-10-CM

## 2024-07-18 DIAGNOSIS — N93.8 DUB (DYSFUNCTIONAL UTERINE BLEEDING): ICD-10-CM

## 2024-07-18 DIAGNOSIS — Z12.4 CERVICAL CANCER SCREENING: ICD-10-CM

## 2024-07-18 DIAGNOSIS — Z11.4 SCREENING FOR HIV (HUMAN IMMUNODEFICIENCY VIRUS): ICD-10-CM

## 2024-07-18 DIAGNOSIS — H93.91 PROBLEM OF RIGHT EAR: ICD-10-CM

## 2024-07-18 DIAGNOSIS — M72.2 PLANTAR FASCIITIS, RIGHT: ICD-10-CM

## 2024-07-18 DIAGNOSIS — Z00.00 ROUTINE GENERAL MEDICAL EXAMINATION AT A HEALTH CARE FACILITY: ICD-10-CM

## 2024-07-18 DIAGNOSIS — H91.90 HEARING LOSS, UNSPECIFIED HEARING LOSS TYPE, UNSPECIFIED LATERALITY: ICD-10-CM

## 2024-07-18 DIAGNOSIS — Z11.59 NEED FOR HEPATITIS C SCREENING TEST: ICD-10-CM

## 2024-07-18 LAB
CHOLEST SERPL-MCNC: 190 MG/DL
FASTING STATUS PATIENT QL REPORTED: YES
HBA1C MFR BLD: 5.4 % (ref 0–5.6)
HDLC SERPL-MCNC: 46 MG/DL
LDLC SERPL CALC-MCNC: 127 MG/DL
NONHDLC SERPL-MCNC: 144 MG/DL
TRIGL SERPL-MCNC: 86 MG/DL
TSH SERPL DL<=0.005 MIU/L-ACNC: 2.56 UIU/ML (ref 0.3–4.2)

## 2024-07-18 PROCEDURE — 80061 LIPID PANEL: CPT | Performed by: FAMILY MEDICINE

## 2024-07-18 PROCEDURE — 87624 HPV HI-RISK TYP POOLED RSLT: CPT | Performed by: FAMILY MEDICINE

## 2024-07-18 PROCEDURE — 99396 PREV VISIT EST AGE 40-64: CPT | Performed by: FAMILY MEDICINE

## 2024-07-18 PROCEDURE — 99213 OFFICE O/P EST LOW 20 MIN: CPT | Mod: 25 | Performed by: FAMILY MEDICINE

## 2024-07-18 PROCEDURE — G0145 SCR C/V CYTO,THINLAYER,RESCR: HCPCS | Performed by: FAMILY MEDICINE

## 2024-07-18 PROCEDURE — 36415 COLL VENOUS BLD VENIPUNCTURE: CPT | Performed by: FAMILY MEDICINE

## 2024-07-18 PROCEDURE — 84443 ASSAY THYROID STIM HORMONE: CPT | Performed by: FAMILY MEDICINE

## 2024-07-18 PROCEDURE — 83036 HEMOGLOBIN GLYCOSYLATED A1C: CPT | Performed by: FAMILY MEDICINE

## 2024-07-18 ASSESSMENT — PAIN SCALES - GENERAL: PAINLEVEL: NO PAIN (0)

## 2024-07-18 NOTE — PATIENT INSTRUCTIONS
Right ear -   Please try over the counter allergy medication for 7 to 10 days.  If symptoms are not improving then you can add Flonase or Nasacort 2 sprays in each nostril once daily.  Call or message if symptoms are not improving in one month.         Patient Education   Preventive Care Advice   This is general advice given by our system to help you stay healthy. However, your care team may have specific advice just for you. Please talk to your care team about your preventive care needs.  Nutrition  Eat 5 or more servings of fruits and vegetables each day.  Try wheat bread, brown rice and whole grain pasta (instead of white bread, rice, and pasta).  Get enough calcium and vitamin D. Check the label on foods and aim for 100% of the RDA (recommended daily allowance).  Lifestyle  Exercise at least 150 minutes each week  (30 minutes a day, 5 days a week).  Do muscle strengthening activities 2 days a week. These help control your weight and prevent disease.  No smoking.  Wear sunscreen to prevent skin cancer.  Have a dental exam and cleaning every 6 months.  Yearly exams  See your health care team every year to talk about:  Any changes in your health.  Any medicines your care team has prescribed.  Preventive care, family planning, and ways to prevent chronic diseases.  Shots (vaccines)   HPV shots (up to age 26), if you've never had them before.  Hepatitis B shots (up to age 59), if you've never had them before.  COVID-19 shot: Get this shot when it's due.  Flu shot: Get a flu shot every year.  Tetanus shot: Get a tetanus shot every 10 years.  Pneumococcal, hepatitis A, and RSV shots: Ask your care team if you need these based on your risk.  Shingles shot (for age 50 and up)  General health tests  Diabetes screening:  Starting at age 35, Get screened for diabetes at least every 3 years.  If you are younger than age 35, ask your care team if you should be screened for diabetes.  Cholesterol test: At age 39, start having  a cholesterol test every 5 years, or more often if advised.  Bone density scan (DEXA): At age 50, ask your care team if you should have this scan for osteoporosis (brittle bones).  Hepatitis C: Get tested at least once in your life.  STIs (sexually transmitted infections)  Before age 24: Ask your care team if you should be screened for STIs.  After age 24: Get screened for STIs if you're at risk. You are at risk for STIs (including HIV) if:  You are sexually active with more than one person.  You don't use condoms every time.  You or a partner was diagnosed with a sexually transmitted infection.  If you are at risk for HIV, ask about PrEP medicine to prevent HIV.  Get tested for HIV at least once in your life, whether you are at risk for HIV or not.  Cancer screening tests  Cervical cancer screening: If you have a cervix, begin getting regular cervical cancer screening tests starting at age 21.  Breast cancer scan (mammogram): If you've ever had breasts, begin having regular mammograms starting at age 40. This is a scan to check for breast cancer.  Colon cancer screening: It is important to start screening for colon cancer at age 45.  Have a colonoscopy test every 10 years (or more often if you're at risk) Or, ask your provider about stool tests like a FIT test every year or Cologuard test every 3 years.  To learn more about your testing options, visit:   .  For help making a decision, visit:   https://bit.ly/rp43361.  Prostate cancer screening test: If you have a prostate, ask your care team if a prostate cancer screening test (PSA) at age 55 is right for you.  Lung cancer screening: If you are a current or former smoker ages 50 to 80, ask your care team if ongoing lung cancer screenings are right for you.  For informational purposes only. Not to replace the advice of your health care provider. Copyright   2023 [employer]. All rights reserved. Clinically reviewed by the [facility] Transitions  Program. [employer] 274756 - REV 01/24.

## 2024-07-18 NOTE — PROGRESS NOTES
"Preventive Care Visit  [facility]  Cyndee Daley MD, Family Medicine  Jul 18, 2024      Assessment & Plan     Routine general medical examination at a health care facility  Scheduled with dermatology for skin check 11/2024     Screening for HIV (human immunodeficiency virus)  Need for hepatitis C screening test  - previously declined    Cervical cancer screening  - Pap Screen with HPV - Recommended Age 30 - 65 Years  - Gynecologic Cytology (PAP)    Lipid screening  - Lipid panel reflex to direct LDL Fasting; Future  - Lipid panel reflex to direct LDL Fasting    Screening for diabetes mellitus  - Hemoglobin A1c; Future  - Hemoglobin A1c    Hepatitis B immune    DUB (dysfunctional uterine bleeding)  - ordered below for further evaluation  - referred to GYN   - TSH with free T4 reflex; Future  - Ob/Gyn  Referral; Future  - US Pelvic Complete with Transvaginal; Future  - TSH with free T4 reflex    Hearing loss, unspecified hearing loss type, unspecified laterality  - Adult Audiology  Referral; Future    Plantar fasciitis, right  - Physical Therapy  Referral; Future    Right ear problem  - advised below   Please try over the counter allergy medication for 7 to 10 days.  If symptoms are not improving then you can add Flonase or Nasacort 2 sprays in each nostril once daily.  Call or message if symptoms are not improving in one month.             BMI  Estimated body mass index is 34.87 kg/m  as calculated from the following:    Height as of this encounter: 1.56 m (5' 1.42\").    Weight as of this encounter: 84.9 kg (187 lb 1.6 oz).       Counseling  Appropriate preventive services were addressed with this patient via screening, questionnaire, or discussion as appropriate for fall prevention, nutrition, physical activity, Tobacco-use cessation, weight loss and cognition.  Checklist reviewing preventive services available has been given to the patient.  Reviewed " patient's diet, addressing concerns and/or questions.   She is at risk for psychosocial distress and has been provided with information to reduce risk.               Maria Del Carmen Marcus is a 43 year old, presenting for the following:  Physical and Gyn Exam        7/18/2024     8:03 AM   Additional Questions   Roomed by Hanny CHAMPAGNE   Accompanied by self        Health Care Directive  Patient does not have a Health Care Directive or Living Will: Discussed advance care planning with patient; however, patient declined at this time.    HPI    Over the last few years cycles are irregular and can be heavier or lighter. If they are heavy then checking tampon every 2 hours which is soaked. Otherwise she can have spotting for one month. No light headiness or dizzy. She has been able to keep donating blood but occasionally her iron will be low. She donates blood every 8 weeks.Last blood donation 5/21/24.            7/15/2024   General Health   How would you rate your overall physical health? Good   Feel stress (tense, anxious, or unable to sleep) Only a little      (!) STRESS CONCERN      7/15/2024   Nutrition   Three or more servings of calcium each day? Yes   Diet: Regular (no restrictions)   How many servings of fruit and vegetables per day? (!) 2-3   How many sweetened beverages each day? 0-1            7/15/2024   Exercise   Days per week of moderate/strenous exercise 4 days   Average minutes spent exercising at this level 40 min            7/15/2024   Social Factors   Frequency of gathering with friends or relatives Three times a week   Worry food won't last until get money to buy more No   Food not last or not have enough money for food? No   Do you have housing? (Housing is defined as stable permanent housing and does not include staying ouside in a car, in a tent, in an abandoned building, in an overnight shelter, or couch-surfing.) Yes   Are you worried about losing your housing? No   Lack of transportation? No   Unable  to get utilities (heat,electricity)? No            7/15/2024   Dental   Dentist two times every year? Yes            4/26/2024   TB Screening   Were you born outside of the [place]? No          7/15/2024   Substance Use   Alcohol more than 3/day or more than 7/wk No   Do you use any other substances recreationally? No        Social History     Tobacco Use    Smoking status: Never     Passive exposure: Never    Smokeless tobacco: Never   Vaping Use    Vaping status: Never Used   Substance Use Topics    Alcohol use: Yes     Comment: 3-4 drinks per week    Drug use: No         6/13/2024   LAST FHS-7 RESULTS   1st degree relative breast or ovarian cancer No   Any relative bilateral breast cancer No   Any male have breast cancer No   Any ONE woman have BOTH breast AND ovarian cancer No   Any woman with breast cancer before 50yrs No   2 or more relatives with breast AND/OR ovarian cancer No   2 or more relatives with breast AND/OR bowel cancer No              7/15/2024   One time HIV Screening   Previous HIV test? No          7/15/2024   STI Screening   New sexual partner(s) since last STI/HIV test? No        History of abnormal Pap smear: No - age 30- 64 PAP with HPV every 5 years recommended        Latest Ref Rng & Units 10/2/2019     2:05 PM 10/2/2019     1:59 PM 9/25/2015    12:00 AM   PAP / HPV   PAP (Historical)   NIL  NIL    HPV 16 DNA NEG^Negative Negative      HPV 18 DNA NEG^Negative Negative      Other HR HPV NEG^Negative Negative        ASCVD Risk   The ASCVD Risk score (Janna DK, et al., 2019) failed to calculate for the following reasons:    The systolic blood pressure is missing        7/15/2024   Contraception/Family Planning   Questions about contraception or family planning No           Reviewed and updated as needed this visit by Provider                             Objective    Exam  LMP 05/24/2024 (Exact Date)    Estimated body mass index is 33.11 kg/m  as calculated from the following:    Height  "as of 5/1/23: 1.575 m (5' 2\").    Weight as of 5/1/23: 82.1 kg (181 lb).    Physical Exam  /87 (BP Location: Right arm, Patient Position: Sitting, Cuff Size: Adult Regular)   Pulse 74   Temp 97.2  F (36.2  C) (Temporal)   Resp 18   Ht 1.56 m (5' 1.42\")   Wt 84.9 kg (187 lb 1.6 oz)   LMP 05/24/2024 (Exact Date)   SpO2 98%   BMI 34.87 kg/m             Signed Electronically by: Cyndee Daley MD    "

## 2024-07-19 ENCOUNTER — OFFICE VISIT (OUTPATIENT)
Dept: AUDIOLOGY | Facility: CLINIC | Age: 44
End: 2024-07-19
Payer: COMMERCIAL

## 2024-07-19 ENCOUNTER — HOSPITAL ENCOUNTER (OUTPATIENT)
Dept: ULTRASOUND IMAGING | Facility: CLINIC | Age: 44
Discharge: HOME | End: 2024-07-19
Attending: FAMILY MEDICINE | Admitting: FAMILY MEDICINE
Payer: COMMERCIAL

## 2024-07-19 DIAGNOSIS — H90.72 MIXED CONDUCTIVE AND SENSORINEURAL HEARING LOSS OF LEFT EAR WITH UNRESTRICTED HEARING OF RIGHT EAR: Primary | ICD-10-CM

## 2024-07-19 DIAGNOSIS — N93.8 DUB (DYSFUNCTIONAL UTERINE BLEEDING): ICD-10-CM

## 2024-07-19 LAB
HPV HR 12 DNA CVX QL NAA+PROBE: NEGATIVE
HPV16 DNA CVX QL NAA+PROBE: NEGATIVE
HPV18 DNA CVX QL NAA+PROBE: NEGATIVE
HUMAN PAPILLOMA VIRUS FINAL DIAGNOSIS: NORMAL

## 2024-07-19 PROCEDURE — 92557 COMPREHENSIVE HEARING TEST: CPT | Performed by: AUDIOLOGIST

## 2024-07-19 PROCEDURE — 76830 TRANSVAGINAL US NON-OB: CPT | Mod: 26 | Performed by: STUDENT IN AN ORGANIZED HEALTH CARE EDUCATION/TRAINING PROGRAM

## 2024-07-19 PROCEDURE — 76830 TRANSVAGINAL US NON-OB: CPT

## 2024-07-19 PROCEDURE — 92565 STENGER TEST PURE TONE: CPT | Performed by: AUDIOLOGIST

## 2024-07-19 PROCEDURE — 92550 TYMPANOMETRY & REFLEX THRESH: CPT | Performed by: AUDIOLOGIST

## 2024-07-19 PROCEDURE — 76856 US EXAM PELVIC COMPLETE: CPT | Mod: 26 | Performed by: STUDENT IN AN ORGANIZED HEALTH CARE EDUCATION/TRAINING PROGRAM

## 2024-07-19 PROCEDURE — 76856 US EXAM PELVIC COMPLETE: CPT

## 2024-07-19 NOTE — PROGRESS NOTES
AUDIOLOGY REPORT    SUBJECTIVE:  Angeline Aceves is a 43 year old female who was seen in the Audiology Clinic at the [facility] [place] for audiologic evaluation, referred by Cyndee Daley M.D. The patient reports near complete hearing loss in the left ear since a jaw surgery at age 16. Reports having exploratory left ear surgery with Dr. Pennington 30 years ago, but the cause of hearing loss was not found. She reports the right ear has felt a little congested since [place], but also has seasonal allergies that may be contributing. Reports having PE tubes as a child. Denies pain, drainage, dizziness, tinnitus, and history of noise exposure.    OBJECTIVE:  Abuse Screening:  Do you feel unsafe at home or work/school? No  Do you feel threatened by someone? No  Does anyone try to keep you from having contact with others, or doing things outside of your home? No  Physical signs of abuse present? No     Fall Risk Screen:  1. Have you fallen two or more times in the past year? No  2. Have you fallen and had an injury in the past year? No      Otoscopic exam revealed clear ear canal on the right, and partially occluding cerumen on the left. Cerumen removal performed with lighted curette, after which a flesh colored protrusion was visualized in superior portion of ear canal.     Pure Tone Thresholds assessed using conventional audiometry with good  reliability from 250-8000 Hz bilaterally using insert earphones and circumaural headphones     RIGHT:  normal  hearing  sensitivity    LEFT:    profound sloping to  unmeasurable  mixed hearing loss    Tympanogram:    RIGHT: normal eardrum mobility    LEFT:   restricted eardrum mobility     Reflexes (reported by stimulus ear):  RIGHT: Ipsilateral is present at elevated levels  RIGHT: Contralateral is absent at frequencies tested  LEFT:   Ipsilateral is absent at frequencies tested  LEFT:   Contralateral is absent at  frequencies tested      Speech Reception/Detection Threshold:    RIGHT: 20 dB HL    LEFT:   105 dB HL  Word Recognition Score:     RIGHT: 100% at 60 dB HL using NU-6 recorded word list.    LEFT:   0% at 115 dB HL using NU-6 recorded word list.      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 Today s results were discussed with the patient in detail.     PLAN:  Patient was counseled regarding hearing loss and impact on communication.  Patient is a good candidate for a CROS system at this time- she would like to think about this before proceeding with scheduling. She is interested in getting a custom earplug for swimming for the right ear, and will schedule this on her way out.  It is recommended that the patient follow up with ENT regarding left mixed HL and protrusion/growth in ear canal. Return to monitor hearing per medical indication.  Please call this clinic with questions regarding these results or recommendations.        Patricio Chambers.  Licensed Audiologist  [place] # [id number]

## 2024-07-24 LAB
BKR LAB AP GYN ADEQUACY: NORMAL
BKR LAB AP GYN INTERPRETATION: NORMAL
BKR LAB AP PREVIOUS ABNORMAL: NORMAL
PATH REPORT.COMMENTS IMP SPEC: NORMAL
PATH REPORT.COMMENTS IMP SPEC: NORMAL
PATH REPORT.RELEVANT HX SPEC: NORMAL

## 2024-07-25 ASSESSMENT — ACTIVITIES OF DAILY LIVING (ADL)
MAKING_SHARP_TURNS_WHILE_RUNNING_FAST: A LITTLE BIT OF DIFFICULTY
YOUR_USUAL_HOBBIES,_RECREATIONAL_OR_SPORTING_ACTIVITIES: A LITTLE BIT OF DIFFICULTY
STANDING_FOR_1_HOUR: A LITTLE BIT OF DIFFICULTY
RUNNING_ON_UNEVEN_GROUND: A LITTLE BIT OF DIFFICULTY
WALKING_A_MILE: NO DIFFICULTY
SITTING_FOR_1_HOUR: NO DIFFICULTY
SQUATTING: MODERATE DIFFICULTY
GETTING_INTO_AND_OUT_OF_A_BATH: NO DIFFICULTY
RUNNING_ON_EVEN_GROUND: A LITTLE BIT OF DIFFICULTY
GETTING_INTO_OR_OUT_OF_A_CAR: NO DIFFICULTY
PERFORMING_HEAVY_ACTIVITIES_AROUND_YOUR_HOME: A LITTLE BIT OF DIFFICULTY
WALKING_2_BLOCKS: NO DIFFICULTY
LEFS_SCORE(%): 0
WALKING_BETWEEN_ROOMS: A LITTLE BIT OF DIFFICULTY
PLEASE_INDICATE_YOR_PRIMARY_REASON_FOR_REFERRAL_TO_THERAPY:: FOOT AND/OR ANKLE
ROLLING_OVER_IN_BED: NO DIFFICULTY
SHOPPING: A LITTLE BIT OF DIFFICULTY
ANY_OF_YOUR_USUAL_WORK,_HOUSEWORK_OR_SCHOOL_ACTIVITIES: A LITTLE BIT OF DIFFICULTY
LEFS_RAW_SCORE: 0
PUTTING_ON_YOUR_SHOES_OR_SOCKS: NO DIFFICULTY
LIFTING_AN_OBJECT,_LIKE_A_BAG_OF_GROCERIES_FROM_THE_FLOOR: NO DIFFICULTY
GOING_UP_OR_DOWN_10_STAIRS: NO DIFFICULTY
PERFORMING_LIGHT_ACTIVITIES_AROUND_YOUR_HOME: A LITTLE BIT OF DIFFICULTY

## 2024-07-29 ENCOUNTER — THERAPY VISIT (OUTPATIENT)
Dept: PHYSICAL THERAPY | Facility: CLINIC | Age: 44
End: 2024-07-29
Payer: COMMERCIAL

## 2024-07-29 DIAGNOSIS — M72.2 PLANTAR FASCIITIS, RIGHT: ICD-10-CM

## 2024-07-29 PROCEDURE — 97161 PT EVAL LOW COMPLEX 20 MIN: CPT | Mod: GP | Performed by: PHYSICAL THERAPIST

## 2024-07-29 PROCEDURE — 97110 THERAPEUTIC EXERCISES: CPT | Mod: GP | Performed by: PHYSICAL THERAPIST

## 2024-07-29 PROCEDURE — 20560 NDL INSJ W/O NJX 1 OR 2 MUSC: CPT | Performed by: PHYSICAL THERAPIST

## 2024-07-29 NOTE — PROGRESS NOTES
PHYSICAL THERAPY EVALUATION  Type of Visit: Evaluation              Subjective       Presenting condition or subjective complaint: Right foot pain Medial arch pain R foot worse in AM, tenderness present during day but not limiting.  Currently training 20 miles per week, walking for golf.  Running shoes- needs to update.  Next race: 10 K Aug 24th, then TCM Oct 6th.  Date of onset:      Relevant medical history: Hearing problems; History of fractures; Overweight   Dates & types of surgery: Ankle ORIF-2/21, hardware removal 10/21    Prior diagnostic imaging/testing results:       Prior therapy history for the same diagnosis, illness or injury: No      Living Environment  Social support: Alone   Type of home: [facility]   Stairs to enter the home: No       Ramp: No   Stairs inside the home: Yes 20 Is there a railing: Yes     Help at home: None  Equipment owned:       Employment: Yes OT  Hobbies/Interests: Golf, running, alpine skiiing    Patient goals for therapy: Not have pain when I first get out of bed    Pain assessment: Pain present     Objective   FOOT/ANKLE EVALUATION  PAIN: Pain Level at Rest: 1/10  Pain Level with Use: 4/10  Pain Location: right medial calcaneus  Pain Quality: Sharp  Pain Frequency: intermittent  Pain is Worst: am  Pain is Exacerbated By: steps first in the am  Pain is Relieved By: use  Pain Progression: Unchanged  GAIT:   Weightbearing Status: WBAT  Assistive Device(s): None  Gait Deviations: Medial heel whip L, Medial heel whip R, Push off decreased R  BALANCE/PROPRIOCEPTION: Single Leg Stance Eyes Open (seconds): inc extensors  WEIGHT BEARING ALIGNMENT: Foot/Ankle WB Alignment:WNL  NON-WEIGHTBEARING ALIGNMENT: WNL   ROM:  ankle DF equal and limited bilaterally    STRENGTH:  Inv+PF limited  FLEXIBILITY:   SPECIAL TESTS:   FUNCTIONAL TESTS:   PALPATION:  right post tib; medial calc  JOINT MOBILITY:  Dec TC, ST    Assessment & Plan   CLINICAL IMPRESSIONS  Medical Diagnosis:      Treatment  Diagnosis:     Impression/Assessment: Patient is a 43 year old female with right heel complaints.  The following significant findings have been identified: Pain, Decreased joint mobility, Decreased strength, Decreased proprioception, Impaired gait, Impaired muscle performance, and Decreased activity tolerance. These impairments interfere with their ability to perform self care tasks and recreational activities as compared to previous level of function.     Clinical Decision Making (Complexity):  Clinical Presentation: Stable/Uncomplicated  Clinical Presentation Rationale: based on medical and personal factors listed in PT evaluation  Clinical Decision Making (Complexity): Low complexity    PLAN OF CARE  Treatment Interventions:  Modalities: Dry Needling  Interventions: Manual Therapy, Neuromuscular Re-education, Therapeutic Activity, Therapeutic Exercise, Self-Care/Home Management    Long Term Goals            Frequency of Treatment:    Duration of Treatment:      Recommended Referrals to Other Professionals: Physical Therapy  Education Assessment:        Risks and benefits of evaluation/treatment have been explained.   Patient/Family/caregiver agrees with Plan of Care.     Evaluation Time:             Signing Clinician: Wilda Galindo PT

## 2024-07-30 PROBLEM — M72.2 PLANTAR FASCIITIS, RIGHT: Status: ACTIVE | Noted: 2024-07-30

## 2024-08-01 ENCOUNTER — OFFICE VISIT (OUTPATIENT)
Dept: AUDIOLOGY | Facility: CLINIC | Age: 44
End: 2024-08-01
Payer: COMMERCIAL

## 2024-08-01 DIAGNOSIS — H90.72 MIXED CONDUCTIVE AND SENSORINEURAL HEARING LOSS OF LEFT EAR WITH UNRESTRICTED HEARING OF RIGHT EAR: Primary | ICD-10-CM

## 2024-08-01 PROCEDURE — V5264 EAR MOLD/INSERT: HCPCS | Performed by: AUDIOLOGIST

## 2024-08-01 NOTE — PROGRESS NOTES
AUDIOLOGY REPORT    SUBJECTIVE: Angeline Aceves is a 43 year old female who was seen in the Audiology Clinic at [facility] on 8/1/2024 for a right earmold impression for a right swim plug.    OBJECTIVE: Otoscopy revealed a clear canal for the right ear. A right earmold impression was taken without incident.    ASSESSMENT: Right swim plug ordered.    PLAN: Angeline would like the right swim plug mailed to her when it arrives at the clinic. Please call this clinic with any questions regarding today s appointment.      Pedro Yi, [facility]  Licensed Audiologist  [place] #[id number]

## 2024-08-16 ENCOUNTER — THERAPY VISIT (OUTPATIENT)
Dept: PHYSICAL THERAPY | Facility: CLINIC | Age: 44
End: 2024-08-16
Payer: COMMERCIAL

## 2024-08-16 DIAGNOSIS — M72.2 PLANTAR FASCIITIS, RIGHT: Primary | ICD-10-CM

## 2024-08-16 PROCEDURE — 20560 NDL INSJ W/O NJX 1 OR 2 MUSC: CPT | Performed by: PHYSICAL THERAPIST

## 2024-08-16 PROCEDURE — 97140 MANUAL THERAPY 1/> REGIONS: CPT | Mod: GP | Performed by: PHYSICAL THERAPIST

## 2024-08-16 PROCEDURE — 97110 THERAPEUTIC EXERCISES: CPT | Mod: GP | Performed by: PHYSICAL THERAPIST

## 2024-09-09 ENCOUNTER — TELEPHONE (OUTPATIENT)
Dept: AUDIOLOGY | Facility: CLINIC | Age: 44
End: 2024-09-09
Payer: COMMERCIAL

## 2024-09-19 NOTE — TELEPHONE ENCOUNTER
"FUTURE VISIT INFORMATION      FUTURE VISIT INFORMATION:  Date: 12/2/24  Time: 10:40 AM  Location: [facility] - ENT  REFERRAL INFORMATION:  Referring provider:    Referring providers clinic:    Reason for visit/diagnosis:  left mixed loss/growth in ear canal ( saw Dr. Pennington in the past)  Audio 7/19     RECORDS REQUESTED FROM      Clinic name Comments Records Status Imaging Status   [facility] 7/19/24 OV with Mani Noguera AuD   7/19/24 audiogram Epic            \"Please notify/message CSS if patient completed outside imaging prior to scheduled appointment and/or any outside records that might have been missed at pre visit -Thanks\"  "

## 2024-11-15 ENCOUNTER — OFFICE VISIT (OUTPATIENT)
Dept: AUDIOLOGY | Facility: CLINIC | Age: 44
End: 2024-11-15
Payer: COMMERCIAL

## 2024-11-15 DIAGNOSIS — H90.5 SENSORINEURAL HEARING LOSS OF LEFT EAR: Primary | ICD-10-CM

## 2024-11-15 NOTE — PROGRESS NOTES
AUDIOLOGY REPORT    SUBJECTIVE: Angeline Aceves is a 43 year old female who was seen in the Audiology Clinic at [facility] on 8/1/2024 for a right earmold impression for a right swim plug.    OBJECTIVE: Otoscopy revealed a clear canal for the right ear. A right earmold impression was taken without incident.    ASSESSMENT: Right swim plug ordered.    PLAN: Angeline would like the right swim plug mailed to her when it arrives at the clinic. Please call this clinic with any questions regarding today s appointment.      Pedro Yi, [facility]  Licensed Audiologist  [place] #[id number]

## 2024-12-02 ENCOUNTER — TELEPHONE (OUTPATIENT)
Dept: OTOLARYNGOLOGY | Facility: CLINIC | Age: 44
End: 2024-12-02

## 2024-12-02 ENCOUNTER — PRE VISIT (OUTPATIENT)
Dept: OTOLARYNGOLOGY | Facility: CLINIC | Age: 44
End: 2024-12-02

## 2024-12-10 ENCOUNTER — DOCUMENTATION ONLY (OUTPATIENT)
Dept: AUDIOLOGY | Facility: CLINIC | Age: 44
End: 2024-12-10
Payer: COMMERCIAL

## 2025-01-13 ENCOUNTER — OFFICE VISIT (OUTPATIENT)
Dept: OBGYN | Facility: CLINIC | Age: 45
End: 2025-01-13
Payer: COMMERCIAL

## 2025-01-13 VITALS — WEIGHT: 190 LBS | DIASTOLIC BLOOD PRESSURE: 68 MMHG | SYSTOLIC BLOOD PRESSURE: 122 MMHG | BODY MASS INDEX: 35.41 KG/M2

## 2025-01-13 DIAGNOSIS — N92.4 PREMENOPAUSAL MENORRHAGIA: Primary | ICD-10-CM

## 2025-01-13 PROCEDURE — 99204 OFFICE O/P NEW MOD 45 MIN: CPT | Performed by: NURSE PRACTITIONER

## 2025-01-13 RX ORDER — NORETHINDRONE ACETATE AND ETHINYL ESTRADIOL .03; 1.5 MG/1; MG/1
1 TABLET ORAL DAILY
Qty: 84 TABLET | Refills: 3 | Status: SHIPPED | OUTPATIENT
Start: 2025-01-13

## 2025-01-13 ASSESSMENT — ANXIETY QUESTIONNAIRES
6. BECOMING EASILY ANNOYED OR IRRITABLE: NOT AT ALL
7. FEELING AFRAID AS IF SOMETHING AWFUL MIGHT HAPPEN: NOT AT ALL
IF YOU CHECKED OFF ANY PROBLEMS ON THIS QUESTIONNAIRE, HOW DIFFICULT HAVE THESE PROBLEMS MADE IT FOR YOU TO DO YOUR WORK, TAKE CARE OF THINGS AT HOME, OR GET ALONG WITH OTHER PEOPLE: NOT DIFFICULT AT ALL
2. NOT BEING ABLE TO STOP OR CONTROL WORRYING: NOT AT ALL
GAD7 TOTAL SCORE: 0
3. WORRYING TOO MUCH ABOUT DIFFERENT THINGS: NOT AT ALL
GAD7 TOTAL SCORE: 0
5. BEING SO RESTLESS THAT IT IS HARD TO SIT STILL: NOT AT ALL
1. FEELING NERVOUS, ANXIOUS, OR ON EDGE: NOT AT ALL

## 2025-01-13 ASSESSMENT — PATIENT HEALTH QUESTIONNAIRE - PHQ9
SUM OF ALL RESPONSES TO PHQ QUESTIONS 1-9: 1
5. POOR APPETITE OR OVEREATING: NOT AT ALL

## 2025-01-13 NOTE — PROGRESS NOTES
SUBJECTIVE:                                                   Angeline Aceves is a 44 year old female who presents to clinic today for the following health issue(s):  Patient presents with:  Abnormal Uterine Bleeding: Now bleeding for weeks and really heavy, changing a tampon after 1 hour. Does donate blood and has had some iron issues that have now resolved       HPI:  NP to me here today to discuss heavy menses.    Menses have been regular. Never used any hormonal contraception.   Not currently sexually active.   No hot flashes/night sweats.   Bleeding has been variable. Recent cycle was light for 3 days, cycle before that was 2-3 weeks of heavy bleeding with large clots.    She had a negative pap in 2024.  Pelvic US was also done summer of 2024-WNL. No fibroids or polyps.     Patient's last menstrual period was 12/29/2024..     Patient is not sexually active, No obstetric history on file..  Using not sexually active for contraception.    reports that she has never smoked. She has never been exposed to tobacco smoke. She has never used smokeless tobacco.    STD testing offered?  Declined    Health maintenance updated:  yes    Today's PHQ-2 Score:       1/13/2025     3:41 PM   PHQ-2 ( 1999 [employer])   Q1: Little interest or pleasure in doing things 0   Q2: Feeling down, depressed or hopeless 0   PHQ-2 Score 0     Today's PHQ-9 Score:       1/13/2025     3:41 PM   PHQ-9 SCORE   PHQ-9 Total Score 1     Today's JOCELINE-7 Score:       1/13/2025     3:41 PM   JOCELINE-7 SCORE   Total Score 0       Problem list and histories reviewed & adjusted, as indicated.  Additional history: as documented.    Patient Active Problem List   Diagnosis    Unilateral deafness    CARDIOVASCULAR SCREENING; LDL GOAL LESS THAN 160    Large breasts    Headache    Anemia    Irritable bowel syndrome    Other acne    Chronic seasonal allergic rhinitis, unspecified trigger    Ankle pain, right    Abnormal gait    Painful orthopaedic hardware    Plantar  fasciitis, right     Past Surgical History:   Procedure Laterality Date    ENT SURGERY      Tubes as a child, exploratory on right in '97    OPEN REDUCTION INTERNAL FIXATION ANKLE Right 2/2/2021    Procedure: Open reduction internal fixation Right ankle;  Surgeon: Simon Hoover MD;  Location: [facility] OR    REMOVE HARDWARE ANKLE Right 10/13/2021    Procedure: Hardware removal Right ankle;  Surgeon: Simon Hoover MD;  Location: [facility] OR    [facility] ANESTH,CLEFT PALATE REPAIR      10 surgeries 2 weeks-16 years of age      Social History     Tobacco Use    Smoking status: Never     Passive exposure: Never    Smokeless tobacco: Never   Substance Use Topics    Alcohol use: Yes     Comment: 3-4 drinks per week      Problem (# of Occurrences) Relation (Name,Age of Onset)    Diabetes (1) Maternal Grandfather (Juan Jose)    Cerebrovascular Disease (1) Paternal Grandmother (Isabella)    Cancer - colorectal (1) Paternal Grandmother (Isabella)    Hyperlipidemia (1) Mother (Yolanda Trejo)    Colon Cancer (1) Paternal Grandmother (Isabella): ~ 70's              Current Outpatient Medications   Medication Sig Dispense Refill    norethindrone-ethinyl estradiol (MICROGESTIN 1.5/30) 1.5-30 MG-MCG tablet Take 1 tablet by mouth daily. . Active tablets only, skip placebo pills. Take continuously. No off week. 84 tablet 3     No current facility-administered medications for this visit.     No Known Allergies    ROS:  12 point review of systems negative other than symptoms noted below or in the HPI.  Genitourinary: Heavy Bleeding with Period  No urinary frequency or dysuria, bladder or kidney problems, POSITIVE for:, heavy periods      OBJECTIVE:     /68   Wt 86.2 kg (190 lb)   LMP 12/29/2024   BMI 35.41 kg/m    Body mass index is 35.41 kg/m .    Exam:  Constitutional:  Appearance: Well nourished, well developed alert, in no acute distress  Neurologic:  Mental Status:  Oriented X3.  Normal strength and tone, sensory exam grossly  normal, mentation intact and speech normal.    Psychiatric:  Mentation appears normal and affect normal/bright.     In-Clinic Test Results:  No results found for this or any previous visit (from the past 24 hours).    ASSESSMENT/PLAN:                                                        ICD-10-CM    1. Premenopausal menorrhagia  N92.4 norethindrone-ethinyl estradiol (MICROGESTIN 1.5/30) 1.5-30 MG-MCG tablet          There are no Patient Instructions on file for this visit.    43 yo female with perimenopausal menorrhagia. We discussed the following options:  Observation, repeat US, medication therapy with hormonal cycle control. Pills, ring, IUD discussed.   Pt would like to try OCP's.   Pt is not a smoker, no hx of migraines.   Mammo is UTD.    (35 minutes was spent on the date of the encounter doing chart review, review of outside records, review and interpretation of pertinent test results, history and exam, documentation, patient counseling, and further activities as noted above.)      HEDY Thomas CNP  [facility] [place]

## 2025-06-16 ENCOUNTER — ANCILLARY PROCEDURE (OUTPATIENT)
Dept: MAMMOGRAPHY | Facility: CLINIC | Age: 45
End: 2025-06-16
Attending: FAMILY MEDICINE
Payer: COMMERCIAL

## 2025-06-16 DIAGNOSIS — Z12.31 VISIT FOR SCREENING MAMMOGRAM: ICD-10-CM

## 2025-06-16 PROCEDURE — 77067 SCR MAMMO BI INCL CAD: CPT | Performed by: RADIOLOGY

## 2025-06-16 PROCEDURE — 77063 BREAST TOMOSYNTHESIS BI: CPT | Performed by: RADIOLOGY

## 2025-07-27 SDOH — HEALTH STABILITY: PHYSICAL HEALTH: ON AVERAGE, HOW MANY DAYS PER WEEK DO YOU ENGAGE IN MODERATE TO STRENUOUS EXERCISE (LIKE A BRISK WALK)?: 4 DAYS

## 2025-07-27 SDOH — HEALTH STABILITY: PHYSICAL HEALTH: ON AVERAGE, HOW MANY MINUTES DO YOU ENGAGE IN EXERCISE AT THIS LEVEL?: 30 MIN

## 2025-07-27 ASSESSMENT — SOCIAL DETERMINANTS OF HEALTH (SDOH): HOW OFTEN DO YOU GET TOGETHER WITH FRIENDS OR RELATIVES?: THREE TIMES A WEEK

## 2025-08-01 ENCOUNTER — OFFICE VISIT (OUTPATIENT)
Dept: FAMILY MEDICINE | Facility: CLINIC | Age: 45
End: 2025-08-01
Payer: COMMERCIAL

## 2025-08-01 VITALS
TEMPERATURE: 98 F | OXYGEN SATURATION: 97 % | SYSTOLIC BLOOD PRESSURE: 136 MMHG | RESPIRATION RATE: 18 BRPM | WEIGHT: 188 LBS | HEIGHT: 61 IN | HEART RATE: 90 BPM | BODY MASS INDEX: 35.5 KG/M2 | DIASTOLIC BLOOD PRESSURE: 82 MMHG

## 2025-08-01 DIAGNOSIS — Z11.4 SCREENING FOR HIV (HUMAN IMMUNODEFICIENCY VIRUS): ICD-10-CM

## 2025-08-01 DIAGNOSIS — Z11.59 NEED FOR HEPATITIS C SCREENING TEST: ICD-10-CM

## 2025-08-01 DIAGNOSIS — D64.9 NORMOCYTIC ANEMIA: ICD-10-CM

## 2025-08-01 DIAGNOSIS — R03.0 ELEVATED BLOOD PRESSURE READING WITHOUT DIAGNOSIS OF HYPERTENSION: ICD-10-CM

## 2025-08-01 DIAGNOSIS — Z00.00 ROUTINE GENERAL MEDICAL EXAMINATION AT A HEALTH CARE FACILITY: Primary | ICD-10-CM

## 2025-08-01 PROBLEM — M25.571 ANKLE PAIN, RIGHT: Status: RESOLVED | Noted: 2021-02-15 | Resolved: 2025-08-01

## 2025-08-01 PROBLEM — T84.84XA PAINFUL ORTHOPAEDIC HARDWARE: Status: RESOLVED | Noted: 2021-09-24 | Resolved: 2025-08-01

## 2025-08-01 PROBLEM — R26.9 ABNORMAL GAIT: Status: RESOLVED | Noted: 2021-02-15 | Resolved: 2025-08-01

## 2025-08-01 LAB
ANION GAP SERPL CALCULATED.3IONS-SCNC: 10 MMOL/L (ref 7–15)
BASOPHILS # BLD AUTO: 0 10E3/UL (ref 0–0.2)
BASOPHILS NFR BLD AUTO: 0 %
BUN SERPL-MCNC: 14.7 MG/DL (ref 6–20)
CALCIUM SERPL-MCNC: 8.8 MG/DL (ref 8.8–10.4)
CHLORIDE SERPL-SCNC: 105 MMOL/L (ref 98–107)
CHOLEST SERPL-MCNC: 217 MG/DL
CREAT SERPL-MCNC: 0.58 MG/DL (ref 0.51–0.95)
EGFRCR SERPLBLD CKD-EPI 2021: >90 ML/MIN/1.73M2
EOSINOPHIL # BLD AUTO: 0.1 10E3/UL (ref 0–0.7)
EOSINOPHIL NFR BLD AUTO: 2 %
ERYTHROCYTE [DISTWIDTH] IN BLOOD BY AUTOMATED COUNT: 13.3 % (ref 10–15)
FASTING STATUS PATIENT QL REPORTED: NO
FASTING STATUS PATIENT QL REPORTED: NO
FERRITIN SERPL-MCNC: 19 NG/ML (ref 6–175)
GLUCOSE SERPL-MCNC: 89 MG/DL (ref 70–99)
HCO3 SERPL-SCNC: 23 MMOL/L (ref 22–29)
HCT VFR BLD AUTO: 33 % (ref 35–47)
HDLC SERPL-MCNC: 44 MG/DL
HGB BLD-MCNC: 10.8 G/DL (ref 11.7–15.7)
HIV 1+2 AB+HIV1 P24 AG SERPL QL IA: NONREACTIVE
IMM GRANULOCYTES # BLD: 0 10E3/UL
IMM GRANULOCYTES NFR BLD: 0 %
LDLC SERPL CALC-MCNC: 100 MG/DL
LYMPHOCYTES # BLD AUTO: 2.1 10E3/UL (ref 0.8–5.3)
LYMPHOCYTES NFR BLD AUTO: 29 %
MCH RBC QN AUTO: 28.6 PG (ref 26.5–33)
MCHC RBC AUTO-ENTMCNC: 32.7 G/DL (ref 31.5–36.5)
MCV RBC AUTO: 88 FL (ref 78–100)
MONOCYTES # BLD AUTO: 0.5 10E3/UL (ref 0–1.3)
MONOCYTES NFR BLD AUTO: 7 %
NEUTROPHILS # BLD AUTO: 4.4 10E3/UL (ref 1.6–8.3)
NEUTROPHILS NFR BLD AUTO: 61 %
NONHDLC SERPL-MCNC: 173 MG/DL
PLATELET # BLD AUTO: 272 10E3/UL (ref 150–450)
POTASSIUM SERPL-SCNC: 3.6 MMOL/L (ref 3.4–5.3)
RBC # BLD AUTO: 3.77 10E6/UL (ref 3.8–5.2)
SODIUM SERPL-SCNC: 138 MMOL/L (ref 135–145)
TRIGL SERPL-MCNC: 364 MG/DL
VIT D+METAB SERPL-MCNC: 28 NG/ML (ref 20–50)
WBC # BLD AUTO: 7.2 10E3/UL (ref 4–11)

## 2025-08-01 PROCEDURE — 86140 C-REACTIVE PROTEIN: CPT | Performed by: INTERNAL MEDICINE

## 2025-08-01 PROCEDURE — 3049F LDL-C 100-129 MG/DL: CPT | Performed by: INTERNAL MEDICINE

## 2025-08-01 PROCEDURE — 80061 LIPID PANEL: CPT | Performed by: INTERNAL MEDICINE

## 2025-08-01 PROCEDURE — 99213 OFFICE O/P EST LOW 20 MIN: CPT | Mod: 25 | Performed by: INTERNAL MEDICINE

## 2025-08-01 PROCEDURE — 3079F DIAST BP 80-89 MM HG: CPT | Performed by: INTERNAL MEDICINE

## 2025-08-01 PROCEDURE — 85025 COMPLETE CBC W/AUTO DIFF WBC: CPT | Performed by: INTERNAL MEDICINE

## 2025-08-01 PROCEDURE — 99396 PREV VISIT EST AGE 40-64: CPT | Mod: 25 | Performed by: INTERNAL MEDICINE

## 2025-08-01 PROCEDURE — 82728 ASSAY OF FERRITIN: CPT | Performed by: INTERNAL MEDICINE

## 2025-08-01 PROCEDURE — 90715 TDAP VACCINE 7 YRS/> IM: CPT | Performed by: INTERNAL MEDICINE

## 2025-08-01 PROCEDURE — 80048 BASIC METABOLIC PNL TOTAL CA: CPT | Performed by: INTERNAL MEDICINE

## 2025-08-01 PROCEDURE — 36415 COLL VENOUS BLD VENIPUNCTURE: CPT | Performed by: INTERNAL MEDICINE

## 2025-08-01 PROCEDURE — 87389 HIV-1 AG W/HIV-1&-2 AB AG IA: CPT | Performed by: INTERNAL MEDICINE

## 2025-08-01 PROCEDURE — 86803 HEPATITIS C AB TEST: CPT | Performed by: INTERNAL MEDICINE

## 2025-08-01 PROCEDURE — 82306 VITAMIN D 25 HYDROXY: CPT | Performed by: INTERNAL MEDICINE

## 2025-08-01 PROCEDURE — 83540 ASSAY OF IRON: CPT | Performed by: INTERNAL MEDICINE

## 2025-08-01 PROCEDURE — 3075F SYST BP GE 130 - 139MM HG: CPT | Performed by: INTERNAL MEDICINE

## 2025-08-01 PROCEDURE — 83550 IRON BINDING TEST: CPT | Performed by: INTERNAL MEDICINE

## 2025-08-01 PROCEDURE — 90471 IMMUNIZATION ADMIN: CPT | Performed by: INTERNAL MEDICINE

## 2025-08-01 RX ORDER — DESONIDE 0.5 MG/G
CREAM TOPICAL 2 TIMES DAILY PRN
COMMUNITY
Start: 2025-01-17

## 2025-08-02 LAB — HCV AB SERPL QL IA: NONREACTIVE

## 2025-08-04 LAB
CRP SERPL-MCNC: 7.82 MG/L
IRON BINDING CAPACITY (ROCHE): 453 UG/DL (ref 240–430)
IRON SATN MFR SERPL: 10 % (ref 15–46)
IRON SERPL-MCNC: 46 UG/DL (ref 37–145)

## 2025-08-12 ENCOUNTER — MYC MEDICAL ADVICE (OUTPATIENT)
Dept: FAMILY MEDICINE | Facility: CLINIC | Age: 45
End: 2025-08-12
Payer: COMMERCIAL

## (undated) DEVICE — SUCTION MANIFOLD NEPTUNE 2 SYS 1 PORT 702-025-000

## (undated) DEVICE — PACK LOWER EXTREMITY CUSTOM ASC

## (undated) DEVICE — GOWN XLG DISP 9545

## (undated) DEVICE — GLOVE PROTEXIS BLUE W/NEU-THERA 8.0  2D73EB80

## (undated) DEVICE — IMM LEG ELEVATOR 79-90191

## (undated) DEVICE — GLOVE PROTEXIS POWDER FREE SMT 7.5  2D72PT75X

## (undated) DEVICE — GLOVE PROTEXIS MICRO 6.5  2D73PM65

## (undated) DEVICE — LINEN GOWN XLG 5407

## (undated) DEVICE — LINEN ORTHO PACK 5446

## (undated) DEVICE — CAST PADDING 4" STERILE 9044S

## (undated) DEVICE — ESU GROUND PAD ADULT W/CORD E7507

## (undated) DEVICE — PREP CHLORAPREP 26ML TINTED ORANGE  260815

## (undated) DEVICE — SOL NACL 0.9% IRRIG 500ML BOTTLE 2F7123

## (undated) DEVICE — DRILL BIT QUICK CONNECT ZIM 3.5X110MM

## (undated) DEVICE — GLOVE PROTEXIS BLUE W/NEU-THERA 7.0  2D73EB70

## (undated) DEVICE — DRAPE C-ARM OEC MINI VIEW 6800   00-901917-01

## (undated) DEVICE — SOL NACL 0.9% IRRIG 1000ML BOTTLE 2F7124

## (undated) DEVICE — DRILL BIT QUICK CONNECT ZIM 2.5X110MM

## (undated) DEVICE — BLADE KNIFE SURG 10 371110

## (undated) DEVICE — BNDG ELASTIC 4"X5YDS UNSTERILE 6611-40

## (undated) RX ORDER — BUPIVACAINE HYDROCHLORIDE 5 MG/ML
INJECTION, SOLUTION EPIDURAL; INTRACAUDAL
Status: DISPENSED
Start: 2021-10-13

## (undated) RX ORDER — ACETAMINOPHEN 325 MG/1
TABLET ORAL
Status: DISPENSED
Start: 2021-10-13

## (undated) RX ORDER — CEFAZOLIN SODIUM 1 G/3ML
INJECTION, POWDER, FOR SOLUTION INTRAMUSCULAR; INTRAVENOUS
Status: DISPENSED
Start: 2021-02-02

## (undated) RX ORDER — CEFAZOLIN SODIUM 2 G/50ML
SOLUTION INTRAVENOUS
Status: DISPENSED
Start: 2021-10-13

## (undated) RX ORDER — GLYCOPYRROLATE 0.2 MG/ML
INJECTION INTRAMUSCULAR; INTRAVENOUS
Status: DISPENSED
Start: 2021-10-13

## (undated) RX ORDER — GABAPENTIN 300 MG/1
CAPSULE ORAL
Status: DISPENSED
Start: 2021-02-02

## (undated) RX ORDER — ONDANSETRON 2 MG/ML
INJECTION INTRAMUSCULAR; INTRAVENOUS
Status: DISPENSED
Start: 2021-10-13

## (undated) RX ORDER — FENTANYL CITRATE 50 UG/ML
INJECTION, SOLUTION INTRAMUSCULAR; INTRAVENOUS
Status: DISPENSED
Start: 2021-02-02

## (undated) RX ORDER — ACETAMINOPHEN 325 MG/1
TABLET ORAL
Status: DISPENSED
Start: 2021-02-02

## (undated) RX ORDER — LIDOCAINE HYDROCHLORIDE 20 MG/ML
INJECTION, SOLUTION EPIDURAL; INFILTRATION; INTRACAUDAL; PERINEURAL
Status: DISPENSED
Start: 2021-10-13

## (undated) RX ORDER — OXYCODONE HYDROCHLORIDE 5 MG/1
TABLET ORAL
Status: DISPENSED
Start: 2021-10-13

## (undated) RX ORDER — DEXAMETHASONE SODIUM PHOSPHATE 4 MG/ML
INJECTION, SOLUTION INTRA-ARTICULAR; INTRALESIONAL; INTRAMUSCULAR; INTRAVENOUS; SOFT TISSUE
Status: DISPENSED
Start: 2021-10-13

## (undated) RX ORDER — FENTANYL CITRATE 50 UG/ML
INJECTION, SOLUTION INTRAMUSCULAR; INTRAVENOUS
Status: DISPENSED
Start: 2021-10-13

## (undated) RX ORDER — PROPOFOL 10 MG/ML
INJECTION, EMULSION INTRAVENOUS
Status: DISPENSED
Start: 2021-10-13